# Patient Record
Sex: FEMALE | Race: ASIAN | NOT HISPANIC OR LATINO | URBAN - METROPOLITAN AREA
[De-identification: names, ages, dates, MRNs, and addresses within clinical notes are randomized per-mention and may not be internally consistent; named-entity substitution may affect disease eponyms.]

---

## 2024-09-14 ENCOUNTER — EMERGENCY (EMERGENCY)
Facility: HOSPITAL | Age: 69
LOS: 1 days | Discharge: HOME | End: 2024-09-14
Attending: INTERNAL MEDICINE | Admitting: INTERNAL MEDICINE
Payer: COMMERCIAL

## 2024-09-14 VITALS
HEART RATE: 62 BPM | TEMPERATURE: 98 F | RESPIRATION RATE: 16 BRPM | OXYGEN SATURATION: 97 % | DIASTOLIC BLOOD PRESSURE: 69 MMHG | WEIGHT: 123.02 LBS | SYSTOLIC BLOOD PRESSURE: 150 MMHG | HEIGHT: 63 IN

## 2024-09-14 VITALS
OXYGEN SATURATION: 99 % | SYSTOLIC BLOOD PRESSURE: 144 MMHG | TEMPERATURE: 98 F | HEART RATE: 58 BPM | DIASTOLIC BLOOD PRESSURE: 80 MMHG | RESPIRATION RATE: 17 BRPM

## 2024-09-14 LAB
ALBUMIN SERPL ELPH-MCNC: 3.6 G/DL — SIGNIFICANT CHANGE UP (ref 3.3–5)
ALP SERPL-CCNC: 139 U/L — HIGH (ref 40–120)
ALT FLD-CCNC: 25 U/L — SIGNIFICANT CHANGE UP (ref 12–78)
ANION GAP SERPL CALC-SCNC: 4 MMOL/L — LOW (ref 5–17)
AST SERPL-CCNC: 21 U/L — SIGNIFICANT CHANGE UP (ref 15–37)
BASOPHILS # BLD AUTO: 0.02 K/UL — SIGNIFICANT CHANGE UP (ref 0–0.2)
BASOPHILS NFR BLD AUTO: 0.3 % — SIGNIFICANT CHANGE UP (ref 0–2)
BILIRUB SERPL-MCNC: 0.6 MG/DL — SIGNIFICANT CHANGE UP (ref 0.2–1.2)
BUN SERPL-MCNC: 16 MG/DL — SIGNIFICANT CHANGE UP (ref 7–23)
CALCIUM SERPL-MCNC: 10.2 MG/DL — HIGH (ref 8.5–10.1)
CHLORIDE SERPL-SCNC: 111 MMOL/L — HIGH (ref 96–108)
CO2 SERPL-SCNC: 25 MMOL/L — SIGNIFICANT CHANGE UP (ref 22–31)
CREAT SERPL-MCNC: 1 MG/DL — SIGNIFICANT CHANGE UP (ref 0.5–1.3)
D DIMER BLD IA.RAPID-MCNC: 215 NG/ML DDU — SIGNIFICANT CHANGE UP
EGFR: 61 ML/MIN/1.73M2 — SIGNIFICANT CHANGE UP
EOSINOPHIL # BLD AUTO: 0.51 K/UL — HIGH (ref 0–0.5)
EOSINOPHIL NFR BLD AUTO: 6.4 % — HIGH (ref 0–6)
GLUCOSE SERPL-MCNC: 130 MG/DL — HIGH (ref 70–99)
HCT VFR BLD CALC: 36.7 % — SIGNIFICANT CHANGE UP (ref 34.5–45)
HGB BLD-MCNC: 12.4 G/DL — SIGNIFICANT CHANGE UP (ref 11.5–15.5)
IMM GRANULOCYTES NFR BLD AUTO: 0.3 % — SIGNIFICANT CHANGE UP (ref 0–0.9)
LYMPHOCYTES # BLD AUTO: 1.68 K/UL — SIGNIFICANT CHANGE UP (ref 1–3.3)
LYMPHOCYTES # BLD AUTO: 21.2 % — SIGNIFICANT CHANGE UP (ref 13–44)
MCHC RBC-ENTMCNC: 29.7 PG — SIGNIFICANT CHANGE UP (ref 27–34)
MCHC RBC-ENTMCNC: 33.8 GM/DL — SIGNIFICANT CHANGE UP (ref 32–36)
MCV RBC AUTO: 88 FL — SIGNIFICANT CHANGE UP (ref 80–100)
MONOCYTES # BLD AUTO: 0.89 K/UL — SIGNIFICANT CHANGE UP (ref 0–0.9)
MONOCYTES NFR BLD AUTO: 11.2 % — SIGNIFICANT CHANGE UP (ref 2–14)
NEUTROPHILS # BLD AUTO: 4.8 K/UL — SIGNIFICANT CHANGE UP (ref 1.8–7.4)
NEUTROPHILS NFR BLD AUTO: 60.6 % — SIGNIFICANT CHANGE UP (ref 43–77)
NRBC # BLD: 0 /100 WBCS — SIGNIFICANT CHANGE UP (ref 0–0)
PLATELET # BLD AUTO: 228 K/UL — SIGNIFICANT CHANGE UP (ref 150–400)
POTASSIUM SERPL-MCNC: 4 MMOL/L — SIGNIFICANT CHANGE UP (ref 3.5–5.3)
POTASSIUM SERPL-SCNC: 4 MMOL/L — SIGNIFICANT CHANGE UP (ref 3.5–5.3)
PROT SERPL-MCNC: 8.4 G/DL — HIGH (ref 6–8.3)
RBC # BLD: 4.17 M/UL — SIGNIFICANT CHANGE UP (ref 3.8–5.2)
RBC # FLD: 11.9 % — SIGNIFICANT CHANGE UP (ref 10.3–14.5)
SODIUM SERPL-SCNC: 140 MMOL/L — SIGNIFICANT CHANGE UP (ref 135–145)
TROPONIN I, HIGH SENSITIVITY RESULT: 10.8 NG/L — SIGNIFICANT CHANGE UP
WBC # BLD: 7.92 K/UL — SIGNIFICANT CHANGE UP (ref 3.8–10.5)
WBC # FLD AUTO: 7.92 K/UL — SIGNIFICANT CHANGE UP (ref 3.8–10.5)

## 2024-09-14 PROCEDURE — 99285 EMERGENCY DEPT VISIT HI MDM: CPT | Mod: 25

## 2024-09-14 PROCEDURE — 80053 COMPREHEN METABOLIC PANEL: CPT

## 2024-09-14 PROCEDURE — 93005 ELECTROCARDIOGRAM TRACING: CPT

## 2024-09-14 PROCEDURE — 71275 CT ANGIOGRAPHY CHEST: CPT | Mod: MC

## 2024-09-14 PROCEDURE — 71275 CT ANGIOGRAPHY CHEST: CPT | Mod: 26,MC

## 2024-09-14 PROCEDURE — 84484 ASSAY OF TROPONIN QUANT: CPT

## 2024-09-14 PROCEDURE — 93010 ELECTROCARDIOGRAM REPORT: CPT

## 2024-09-14 PROCEDURE — 96374 THER/PROPH/DIAG INJ IV PUSH: CPT | Mod: XU

## 2024-09-14 PROCEDURE — 85379 FIBRIN DEGRADATION QUANT: CPT

## 2024-09-14 PROCEDURE — 85025 COMPLETE CBC W/AUTO DIFF WBC: CPT

## 2024-09-14 PROCEDURE — 36415 COLL VENOUS BLD VENIPUNCTURE: CPT

## 2024-09-14 RX ORDER — KETOROLAC TROMETHAMINE 30 MG/ML
30 INJECTION, SOLUTION INTRAMUSCULAR ONCE
Refills: 0 | Status: DISCONTINUED | OUTPATIENT
Start: 2024-09-14 | End: 2024-09-14

## 2024-09-14 RX ADMIN — KETOROLAC TROMETHAMINE 30 MILLIGRAM(S): 30 INJECTION, SOLUTION INTRAMUSCULAR at 06:41

## 2024-09-14 NOTE — ED PROVIDER NOTE - CLINICAL SUMMARY MEDICAL DECISION MAKING FREE TEXT BOX
69 y/o female H/O diagnosed having breast CA 2010 C/C left back pain, left infrascapular reproducible point tenderness , pain on inspiration, no headache, no fever, no chills, no toxemia,  no SOB, no radiation, not exertional chest pain, no abdominal pain, no N/V/D, no urinary symptoms. no stroke like symptoms.   seen by her PMD for the same pain, she was prescribed NSAID ointment and Robaxin. VSS Exam stable  labs ekg enzymes and CT scan are non acute, discharged to OP care

## 2024-09-14 NOTE — ED ADULT TRIAGE NOTE - CCCP TRG CHIEF CMPLNT
back pain/injury Cyclophosphamide Counseling:  I discussed with the patient the risks of cyclophosphamide including but not limited to hair loss, hormonal abnormalities, decreased fertility, abdominal pain, diarrhea, nausea and vomiting, bone marrow suppression and infection. The patient understands that monitoring is required while taking this medication.

## 2024-09-14 NOTE — ED ADULT NURSE NOTE - NSFALLUNIVINTERV_ED_ALL_ED
Bed/Stretcher in lowest position, wheels locked, appropriate side rails in place/Call bell, personal items and telephone in reach/Instruct patient to call for assistance before getting out of bed/chair/stretcher/Non-slip footwear applied when patient is off stretcher/[place] to call system/Physically safe environment - no spills, clutter or unnecessary equipment/Purposeful proactive rounding/Room/bathroom lighting operational, light cord in reach

## 2024-09-14 NOTE — ED PROVIDER NOTE - CARE PROVIDER_API CALL
Hedy Sommer  Cardiology  [address] [place] [address]  Phone: [phone]  Fax: [phone]  Follow Up Time: 1-3 Days

## 2024-09-14 NOTE — ED PROVIDER NOTE - NSFOLLOWUPINSTRUCTIONS_ED_ALL_ED_FT
No PE, no blood clot found on the CT Scan  The CT scan incidental finding for thyroid, Left breast area and vertebre recommending further studies non emergent, out patient. These findings were discussed with the patients daughter

## 2024-09-14 NOTE — ED ADULT NURSE REASSESSMENT NOTE - NS ED NURSE REASSESS COMMENT FT1
0725:  received patient.  she is pending discharge.  she was just medicated for pain relief, but is still complaining of pain and does not understand how or wby she can still be in pain if nothing is wrong.  family concerned.  vitals recorded.  family / patient want to speak to md. vidal rn.

## 2024-09-14 NOTE — ED ADULT NURSE REASSESSMENT NOTE - NS ED NURSE REASSESS COMMENT FT1
0805:  patient is d/c.  iv removed.  patient / family requested to speak to MD due to patient's persistent pain x2 months.  MD reviewed all test results.  Due to the patient's history of breast CA, it was recommended that she f/u with oncology.  She is originially from the [place], and her breast cancer was initially treated at [facility].  Since her spouse passing, the family has moved her out here with them, and they need md rec.  Dr. Mina Garland / Sylvie Acosta MD info was provided, and they were recommended to call the office and f/u.  all test results provided and they were encouraged to bring these with them at the time of the appt.  In the interim, it was rec that she take the prescribed muscle relaxants, warm compresses, gel / lido patch, etc.  family verbalized understanding of all instructions.  wheelchair offered, but patient insisted upon walking out.  vitals were previously recorded.  all belongings left with the patient, and she left in stable condition, steady gait.  if any new / worsening symptoms occurs, it was recommended that she return to the er.  lamin drake.

## 2024-09-14 NOTE — ED PROVIDER NOTE - OBJECTIVE STATEMENT
back pain (left scapula) non-traumatic  back pain/injury left back pain, left infrascapular reproducible point tenderness , pain on inspiration, no SOB, no radiation   H/O diagnosed having  breast CA in 2010,  patients daughter notes that she is in remission 67 y/o female H/O diagnosed having breast CA 2010 C/C left back pain, left infrascapular reproducible point tenderness , pain on inspiration, no headache, no fever, no chills, no toxemia,  no SOB, no radiation, not exertional chest pain, no abdominal pain, no N/V/D, no urinary symptoms. no stroke like symptoms.   seen by her PMD for the same pain, she was prescribed NSAID ointment and Robaxin.

## 2024-09-14 NOTE — ED PROVIDER NOTE - SIGNIFICANT NEGATIVE FINDINGS
no headache, no fever, no chills, no toxemia,  no SOB, no radiation, not exertional chest pain, no abdominal pain, no N/V/D, no urinary symptoms. no stroke like symptoms.

## 2024-09-14 NOTE — ED PROVIDER NOTE - PATIENT PORTAL LINK FT
You can access the FollowMyHealth Patient Portal offered by [facility] by registering at the following website: [email or web address]. By joining [facility]’s FollowMyHealth portal, you will also be able to view your health information using other applications (apps) compatible with our system.

## 2024-09-21 ENCOUNTER — INPATIENT (INPATIENT)
Facility: HOSPITAL | Age: 69
LOS: 2 days | Discharge: HOME | DRG: 316 | End: 2024-09-24
Attending: STUDENT IN AN ORGANIZED HEALTH CARE EDUCATION/TRAINING PROGRAM | Admitting: STUDENT IN AN ORGANIZED HEALTH CARE EDUCATION/TRAINING PROGRAM
Payer: COMMERCIAL

## 2024-09-21 VITALS
DIASTOLIC BLOOD PRESSURE: 35 MMHG | OXYGEN SATURATION: 99 % | WEIGHT: 141.98 LBS | HEART RATE: 69 BPM | SYSTOLIC BLOOD PRESSURE: 55 MMHG | HEIGHT: 63 IN | RESPIRATION RATE: 18 BRPM

## 2024-09-21 DIAGNOSIS — Z98.890 OTHER SPECIFIED POSTPROCEDURAL STATES: Chronic | ICD-10-CM

## 2024-09-21 DIAGNOSIS — E78.00 PURE HYPERCHOLESTEROLEMIA, UNSPECIFIED: ICD-10-CM

## 2024-09-21 DIAGNOSIS — I48.91 UNSPECIFIED ATRIAL FIBRILLATION: ICD-10-CM

## 2024-09-21 DIAGNOSIS — Z29.9 ENCOUNTER FOR PROPHYLACTIC MEASURES, UNSPECIFIED: ICD-10-CM

## 2024-09-21 DIAGNOSIS — I95.9 HYPOTENSION, UNSPECIFIED: ICD-10-CM

## 2024-09-21 DIAGNOSIS — Z90.49 ACQUIRED ABSENCE OF OTHER SPECIFIED PARTS OF DIGESTIVE TRACT: Chronic | ICD-10-CM

## 2024-09-21 DIAGNOSIS — E05.90 THYROTOXICOSIS, UNSPECIFIED WITHOUT THYROTOXIC CRISIS OR STORM: ICD-10-CM

## 2024-09-21 DIAGNOSIS — M54.9 DORSALGIA, UNSPECIFIED: ICD-10-CM

## 2024-09-21 DIAGNOSIS — B34.8 OTHER VIRAL INFECTIONS OF UNSPECIFIED SITE: ICD-10-CM

## 2024-09-21 DIAGNOSIS — I10 ESSENTIAL (PRIMARY) HYPERTENSION: ICD-10-CM

## 2024-09-21 LAB
ALBUMIN SERPL ELPH-MCNC: 3.2 G/DL — LOW (ref 3.3–5)
ALP SERPL-CCNC: 132 U/L — HIGH (ref 40–120)
ALT FLD-CCNC: 24 U/L — SIGNIFICANT CHANGE UP (ref 12–78)
ANION GAP SERPL CALC-SCNC: 9 MMOL/L — SIGNIFICANT CHANGE UP (ref 5–17)
APPEARANCE UR: CLEAR — SIGNIFICANT CHANGE UP
APTT BLD: 37.3 SEC — HIGH (ref 24.5–35.6)
AST SERPL-CCNC: 25 U/L — SIGNIFICANT CHANGE UP (ref 15–37)
BASOPHILS # BLD AUTO: 0.03 K/UL — SIGNIFICANT CHANGE UP (ref 0–0.2)
BASOPHILS NFR BLD AUTO: 0.4 % — SIGNIFICANT CHANGE UP (ref 0–2)
BILIRUB SERPL-MCNC: 0.5 MG/DL — SIGNIFICANT CHANGE UP (ref 0.2–1.2)
BILIRUB UR-MCNC: NEGATIVE — SIGNIFICANT CHANGE UP
BUN SERPL-MCNC: 16 MG/DL — SIGNIFICANT CHANGE UP (ref 7–23)
CALCIUM SERPL-MCNC: 9.4 MG/DL — SIGNIFICANT CHANGE UP (ref 8.5–10.1)
CHLORIDE SERPL-SCNC: 108 MMOL/L — SIGNIFICANT CHANGE UP (ref 96–108)
CO2 SERPL-SCNC: 19 MMOL/L — LOW (ref 22–31)
COLOR SPEC: YELLOW — SIGNIFICANT CHANGE UP
CREAT SERPL-MCNC: 1.5 MG/DL — HIGH (ref 0.5–1.3)
DIFF PNL FLD: NEGATIVE — SIGNIFICANT CHANGE UP
EGFR: 38 ML/MIN/1.73M2 — LOW
EOSINOPHIL # BLD AUTO: 0.51 K/UL — HIGH (ref 0–0.5)
EOSINOPHIL NFR BLD AUTO: 6.3 % — HIGH (ref 0–6)
GLUCOSE SERPL-MCNC: 224 MG/DL — HIGH (ref 70–99)
GLUCOSE UR QL: NEGATIVE MG/DL — SIGNIFICANT CHANGE UP
HCT VFR BLD CALC: 38.1 % — SIGNIFICANT CHANGE UP (ref 34.5–45)
HGB BLD-MCNC: 12.5 G/DL — SIGNIFICANT CHANGE UP (ref 11.5–15.5)
IMM GRANULOCYTES NFR BLD AUTO: 0.4 % — SIGNIFICANT CHANGE UP (ref 0–0.9)
INR BLD: 1.02 RATIO — SIGNIFICANT CHANGE UP (ref 0.85–1.18)
KETONES UR-MCNC: NEGATIVE MG/DL — SIGNIFICANT CHANGE UP
LACTATE SERPL-SCNC: 1.6 MMOL/L — SIGNIFICANT CHANGE UP (ref 0.7–2)
LACTATE SERPL-SCNC: 4.2 MMOL/L — CRITICAL HIGH (ref 0.7–2)
LEUKOCYTE ESTERASE UR-ACNC: NEGATIVE — SIGNIFICANT CHANGE UP
LYMPHOCYTES # BLD AUTO: 2.12 K/UL — SIGNIFICANT CHANGE UP (ref 1–3.3)
LYMPHOCYTES # BLD AUTO: 26.3 % — SIGNIFICANT CHANGE UP (ref 13–44)
MCHC RBC-ENTMCNC: 29.2 PG — SIGNIFICANT CHANGE UP (ref 27–34)
MCHC RBC-ENTMCNC: 32.8 GM/DL — SIGNIFICANT CHANGE UP (ref 32–36)
MCV RBC AUTO: 89 FL — SIGNIFICANT CHANGE UP (ref 80–100)
MONOCYTES # BLD AUTO: 0.51 K/UL — SIGNIFICANT CHANGE UP (ref 0–0.9)
MONOCYTES NFR BLD AUTO: 6.3 % — SIGNIFICANT CHANGE UP (ref 2–14)
NEUTROPHILS # BLD AUTO: 4.85 K/UL — SIGNIFICANT CHANGE UP (ref 1.8–7.4)
NEUTROPHILS NFR BLD AUTO: 60.3 % — SIGNIFICANT CHANGE UP (ref 43–77)
NITRITE UR-MCNC: NEGATIVE — SIGNIFICANT CHANGE UP
NRBC # BLD: 0 /100 WBCS — SIGNIFICANT CHANGE UP (ref 0–0)
PH UR: 7 — SIGNIFICANT CHANGE UP (ref 5–8)
PLATELET # BLD AUTO: 293 K/UL — SIGNIFICANT CHANGE UP (ref 150–400)
POTASSIUM SERPL-MCNC: 4.1 MMOL/L — SIGNIFICANT CHANGE UP (ref 3.5–5.3)
POTASSIUM SERPL-SCNC: 4.1 MMOL/L — SIGNIFICANT CHANGE UP (ref 3.5–5.3)
PROT SERPL-MCNC: 7.9 G/DL — SIGNIFICANT CHANGE UP (ref 6–8.3)
PROT UR-MCNC: NEGATIVE MG/DL — SIGNIFICANT CHANGE UP
PROTHROM AB SERPL-ACNC: 11.6 SEC — SIGNIFICANT CHANGE UP (ref 9.5–13)
RAPID RVP RESULT: DETECTED
RBC # BLD: 4.28 M/UL — SIGNIFICANT CHANGE UP (ref 3.8–5.2)
RBC # FLD: 12 % — SIGNIFICANT CHANGE UP (ref 10.3–14.5)
RV+EV RNA SPEC QL NAA+PROBE: DETECTED
SARS-COV-2 RNA SPEC QL NAA+PROBE: SIGNIFICANT CHANGE UP
SODIUM SERPL-SCNC: 136 MMOL/L — SIGNIFICANT CHANGE UP (ref 135–145)
SP GR SPEC: 1 — LOW (ref 1–1.03)
TROPONIN I, HIGH SENSITIVITY RESULT: 11.4 NG/L — SIGNIFICANT CHANGE UP
TSH SERPL-MCNC: 0.18 UIU/ML — LOW (ref 0.36–3.74)
UROBILINOGEN FLD QL: 0.2 MG/DL — SIGNIFICANT CHANGE UP (ref 0.2–1)
WBC # BLD: 8.05 K/UL — SIGNIFICANT CHANGE UP (ref 3.8–10.5)
WBC # FLD AUTO: 8.05 K/UL — SIGNIFICANT CHANGE UP (ref 3.8–10.5)

## 2024-09-21 PROCEDURE — 70450 CT HEAD/BRAIN W/O DYE: CPT | Mod: 26,MC

## 2024-09-21 PROCEDURE — 99223 1ST HOSP IP/OBS HIGH 75: CPT | Mod: GC

## 2024-09-21 PROCEDURE — 99285 EMERGENCY DEPT VISIT HI MDM: CPT

## 2024-09-21 PROCEDURE — 71250 CT THORAX DX C-: CPT | Mod: 26,MC

## 2024-09-21 PROCEDURE — 74176 CT ABD & PELVIS W/O CONTRAST: CPT | Mod: 26,MC

## 2024-09-21 PROCEDURE — 93010 ELECTROCARDIOGRAM REPORT: CPT

## 2024-09-21 PROCEDURE — 71045 X-RAY EXAM CHEST 1 VIEW: CPT | Mod: 26

## 2024-09-21 RX ORDER — SODIUM CHLORIDE 0.9 % (FLUSH) 0.9 %
2000 SYRINGE (ML) INJECTION ONCE
Refills: 0 | Status: COMPLETED | OUTPATIENT
Start: 2024-09-21 | End: 2024-09-21

## 2024-09-21 RX ORDER — SIMVASTATIN 20 MG
1 TABLET ORAL
Refills: 0 | DISCHARGE

## 2024-09-21 RX ORDER — ACETAMINOPHEN 325 MG
650 TABLET ORAL EVERY 6 HOURS
Refills: 0 | Status: DISCONTINUED | OUTPATIENT
Start: 2024-09-21 | End: 2024-09-24

## 2024-09-21 RX ORDER — ACETAMINOPHEN 325 MG
500 TABLET ORAL
Refills: 0 | DISCHARGE

## 2024-09-21 RX ORDER — TRAMADOL HYDROCHLORIDE 50 MG/1
1 TABLET, COATED ORAL
Refills: 0 | DISCHARGE

## 2024-09-21 RX ORDER — DICLOFENAC SODIUM 10 MG/G
2 GEL TOPICAL
Refills: 0 | DISCHARGE

## 2024-09-21 RX ORDER — CRANBERRY FRUIT EXTRACT 650 MG
50 CAPSULE ORAL
Refills: 0 | DISCHARGE

## 2024-09-21 RX ORDER — PIPERACILLIN SODIUM AND TAZOBACTAM SODIUM 12; 1.5 G/60ML; G/60ML
3.38 INJECTION, POWDER, LYOPHILIZED, FOR SOLUTION INTRAVENOUS ONCE
Refills: 0 | Status: COMPLETED | OUTPATIENT
Start: 2024-09-21 | End: 2024-09-21

## 2024-09-21 RX ORDER — CYCLOBENZAPRINE HCL 10 MG
1 TABLET ORAL
Refills: 0 | DISCHARGE

## 2024-09-21 RX ADMIN — PIPERACILLIN SODIUM AND TAZOBACTAM SODIUM 200 GRAM(S): 12; 1.5 INJECTION, POWDER, LYOPHILIZED, FOR SOLUTION INTRAVENOUS at 17:53

## 2024-09-21 RX ADMIN — Medication 2000 MILLILITER(S): at 16:44

## 2024-09-21 NOTE — ED ADULT NURSE NOTE - NSFALLHARMRISKINTERV_ED_ALL_ED

## 2024-09-21 NOTE — H&P ADULT - ASSESSMENT
Pt is a 68-year-old female with a PMHx of hyperthyroidism on methimazole, hypertension, breast CA 2011 s/p lumpectomy, chemo, radiation, and HLD presents to the ED BIBEMS with weakness admitted for hypotension.  Pt is a 68-year-old female with a PMHx of hyperthyroidism on methimazole, hypertension, breast CA 2011 s/p lumpectomy, chemo, radiation, and HLD presents to the ED BIBEMS with weakness admitted for hypotension, new onset afib, and supportive care for Entero/Rhinovirus.

## 2024-09-21 NOTE — H&P ADULT - PROBLEM SELECTOR PLAN 3
-Pt found to have positive RVP on admission for enterovirus  -S/p zosyn in ED  -Pt does not endorse any symptoms currently   -Pt afebrile, no leukocytosis   -Continue supportive measures and NS IV fluids  -Lactate 4.2 initially, now downtrending   -Monitor for fever and trend WBC  -No need to continue Abx

## 2024-09-21 NOTE — H&P ADULT - PROBLEM SELECTOR PLAN 1
Pt BIBEMS to the ED due to sudden weakness, feeling clammy and loss of vision  -Admit to telemetry  -Pt has hx of HTN on multiple BP meds see below   -In ED BP was 55/35 then inc 80/56 and pt given 2L NS IV bolus, Zosyn IVPB 3.375g  -Hold home BP meds  -Continue to monitor hemodynamics and new/worsening symptoms   -Consult cardiology, Dr. Limon, f/u recs Pt BIBEMS to the ED due to sudden weakness, feeling clammy and loss of vision  -Admit to telemetry  -Pt has hx of HTN on multiple BP meds see below   -In ED BP was 55/35, given 2 L bolus. BP fluid responsive--> inc to 80/56-->  137/92  -Hold home BP meds  -Continue to monitor hemodynamics and new/worsening symptoms

## 2024-09-21 NOTE — ED ADULT NURSE NOTE - OBJECTIVE STATEMENT
A/ox4 patient BIBEMS from home for near syncopal episode. Patient found to be hypotensice and bradycardic by EMS. Patient in new onset afib with slow ventricular rate @55bpm. Patient has n complaints of pain, no sob. Afebrile. Pending further labs and radiology reports.

## 2024-09-21 NOTE — H&P ADULT - NSHPSOCIALHISTORY_GEN_ALL_CORE
Tobacco: denies  EtOH: denies  Recreational drug use: denies  Lives with: Daughter, son-in-law and granddaughter   Ambulates: with cane/walker  ADLs: with supervision

## 2024-09-21 NOTE — H&P ADULT - NSHPREVIEWOFSYSTEMS_GEN_ALL_CORE
REVIEW OF SYSTEMS:  CONSTITUTIONAL: no chills, fever  HENMT: No HA, lightheadedness/dizziness  RESPIRATORY: No cough, wheezing, hemoptysis; No shortness of breath.  CARDIOVASCULAR: No chest pain, palpitations.  GASTROINTESTINAL: No abdominal or epigastric pain. No nausea or vomiting; No diarrhea or constipation.  GENITOURINARY: No dysuria, hematuria, or incontinence  NEUROLOGICAL: Sensation intact bilaterally  SKIN: No itching, rashes  MUSCULOSKELETAL: +back pain L>R

## 2024-09-21 NOTE — H&P ADULT - NSHPLABSRESULTS_GEN_ALL_CORE
RNCC Care Coordination Note    Encounter Summary:  RNCC spoke to the patient's wife who stated the patient has now stopped dialysis as of 12/22/2018. Per his wife is doing good and just wants to eat. \" Everything he see's on TV he wants to eat and last night he had a beer and was very happy.\" The patient's wife seem's in good sprits and supports his decision to stop the dialysis. The patient is at [facility] and has now started with palliative care services.     RNCC Assessments    See Care Coordination Smartform for complete baseline and current assessment.       Current Signs and Symptoms Assessed this encounter: Yes   [] No new or worsening symptoms present  Symptoms present:    Cardiac/Pulmonary   [] Chest Pain    [] Cough   [] Increased Sputum   [] Orthopnea   [] Palpitations   [x] Shortness of breath with activity   [] Shortness of breath at rest   [] Swelling    [] Wheezing  Activity   [x] ADL impairment   [] Dizziness   [x] Increased Fatigue   [] Lightheadedness    [] New Fear of Falling or Recent Fall    [x] Weakness  Mood    [] Angry   [] Anxious   [] Crying   [] Depressed   [] Withdrawn  General   [] Acute Weight Concern   [] Appetite Concern   [] Chills    [x] Confusion   [] Constipation   [] Diaphoresis    [] Diarrhea   [] Increased Thirst   [] Inadequate support    [x] Lethargy   [] Nausea   [] Numbness   [] Sleep Concern   []Tingling    [] Urinary Concern   [] Visual Changes   [] Vomiting    [] Wound   [] Other  Acute Pain Assessment         Chronic Pain Assessment         Labs/Diagnostics Reviewed: Not applicable    Symptom Trends:    COPD Symptom Course   No change  CHF Symptom Course   No change  Diabetes Symptom Course   No change  Activity Status  No change  Other known Chronic Condition Symptom Course : Renal failure  Worsening    Utilization Since last  contact:   None     Medication:  New RX since Last Contact: No   New Medication Concerns (See Patient Level Medication section for baseline  adherence assessment): No      Goals Addressed                 This Visit's Progress       Patient Stated    • <enter goal here> (pt-stated)        Patient has requested to stop dialysis.          RNCC Interventions                Interventions Narrative:  1. RNCC contacted the patient's wife and was she did inform RNCC 12/22/2018 was his last day of dialysis.  2. Palliative care services are involved at the group home.  3. Per his wife she has no concerns at this time he is comfortable and eating good.       Acuity Assessment     Acuity Assessment:   Date Acuity Level   12/27/18 3            LABS:  cret                        12.5   8.05  )-----------( 293      ( 21 Sep 2024 16:05 )             38.1     09-21    136  |  108  |  16  ----------------------------<  224[H]  4.1   |  19[L]  |  1.50[H]    Ca    9.4      21 Sep 2024 16:05    TPro  7.9  /  Alb  3.2[L]  /  TBili  0.5  /  DBili  x   /  AST  25  /  ALT  24  /  AlkPhos  132[H]  09-21    PT/INR - ( 21 Sep 2024 16:05 )   PT: 11.6 sec;   INR: 1.02 ratio      PTT - ( 21 Sep 2024 16:05 )  PTT:37.3 sec     < from: X-ray Chest 1 View- PORTABLE-Urgent (09.21.24 @ 16:35) >  PROCEDURE DATE:  09/21/2024    INTERPRETATION:  TECHNIQUE: Single portable view of the chest.  COMPARISON:  None  CLINICAL HISTORY: Sepsis  FINDINGS:  Single frontal view of the chest demonstrates the lungs to be clear. The   cardiomediastinal silhouette is unremarkable. No acute osseous   abnormalities. Overlying EKG leads and wires are noted. Please refer to   the subsequent chest CT for further details.    IMPRESSION: No acute cardiopulmonary disease process.      < from: CT Head No Cont (09.21.24 @ 18:06) >  IMPRESSION:  1. No acute intracranial hemorrhage, mass effect, or midline shift. If   weakness persists, consider further evaluation via MR imaging to include   DWI and ADC mapping techniques, provided there are no contraindications.  2. Bilateral optic globe proptosis. Correlate clinically for presence of   underlying thyroid ophthalmology.    < from: CT Chest No Cont (09.21.24 @ 18:10) >  IMPRESSION:  Enlarged multinodular thyroid gland. Mild mediastinal lymphadenopathy.     < from: CT Abdomen and Pelvis No Cont (09.21.24 @ 18:10) >  IMPRESSION:  Enlarged multinodular thyroid gland. Mild mediastinal lymphadenopathy.     < from: CT Abdomen and Pelvis No Cont (09.21.24 @ 18:10) >  Trace ascites. Possible periportal edema. No other significant acute   abnormality is identified in this limited study without contrast.

## 2024-09-21 NOTE — ED PROVIDER NOTE - OBJECTIVE STATEMENT
daughter translating per pt request  68-year-old female history of hyperthyroidism on methimazole, hypertension, breast CA 2011 s/p lumpectomy, chemo, radiation, presents with weakness.  Daughter states earlier patient was feeling weak.  Family felt that she she would feel better.  However the patient was having weakness and near syncopal episodes.  Was seen in this ED about 1 week ago due to back pain.  Had evaluation including CTA.  About 2 weeks ago patient had abnormal finding on mammogram and is scheduled for upcoming MRI and biopsy.  When CTA was performed 1 week ago patient had a left breast peripheral rim-enhancing fluid attenuation density with overlying skin thickening.  Also with enlarged thyroid levels which patient is aware about and there has been evaluation for possible surgery.  There was also concern for bony metastasis on this scan.  Denies fever, chills, chest pain, trouble breathing, vomiting patient was started on cyclobenzaprine and tramadol earlier this week when she followed up with her PMD.  Daughter states that the medications are given to her at night    pmd joaquín ho

## 2024-09-21 NOTE — H&P ADULT - NSHPPHYSICALEXAM_GEN_ALL_CORE
T(C): 36.6 (09-21-24 @ 21:01), Max: 36.6 (09-21-24 @ 21:01)  HR: 78 (09-21-24 @ 21:01) (61 - 79)  BP: 137/70 (09-21-24 @ 21:01) (55/35 - 137/70)  RR: 18 (09-21-24 @ 21:01) (18 - 18)  SpO2: 96% (09-21-24 @ 21:01) (94% - 99%)    GENERAL: patient appears stated age, appropriately interactive, NAD  EYES: +proptosis  NECK: supple, soft  LUNGS: CTAB, symmetric breath sounds, no wheezing or rhonchi appreciated  HEART: +irregularly irregular rhythm, no lower extremity edema  GASTROINTESTINAL: abdomen is soft, nontender, nondistended, normoactive bowel sounds  INTEGUMENT: good skin turgor, warm skin, appears well perfused  MUSCULOSKELETAL: +TTP left back  NEUROLOGIC: follows commands, answers questions appropriately   HEME/LYMPH: no obvious ecchymosis or petechiae

## 2024-09-21 NOTE — H&P ADULT - PROBLEM SELECTOR PLAN 4
Pt came to ED 1 week ago for severe back pain and was sent home. Back pain continued so went to PCP  -Pt prescribed tramadol, Flexeril, Tylenol and Naproxen  -Pt has hx of breast cancer and previous CTA from 1 week ago is concerning for possible bony metastasis   -Back pain may be 2/2 mets  -Pt was scheduled for outpt MRI and breast biopsy  -C/w tylenol, tramadol and lidocaine patch for pain  -Heme/onc consulted Dr. Garland, f/u recs

## 2024-09-21 NOTE — H&P ADULT - PROBLEM SELECTOR PLAN 5
-Pt has hx of HTN  -On atenolol, losartan and nifedipine at home  -Hold all home BP meds  -Monitor hemodynamics

## 2024-09-21 NOTE — ED PROVIDER NOTE - CLINICAL SUMMARY MEDICAL DECISION MAKING FREE TEXT BOX
Patient is a 68y old  Female who presents with a chief complaint of hypotension. patient on arrival with triage BP of 50's SBP. this improved with IVF into 80's and patient was mentating at her baseline. family reports patient has been fatigued with lightheadedness and recently only complaint was mid back pain paraspinal. she was seen here and given muscle relaxers which was helping. she has a h/o breast CA    PE: Patient is well appearing, no acute distress, no signs of head trauma, lungs clear bilaterally, abdomen is soft and nontender to palpation without guarding or rebound, normal pulses in all extremities, moving all extremities    plan for labs, imaging, medications, urine, will assess for possible infection, thyroid abnormality, possible patient suffering from poly pharmacy or medication overdose    patient found to have elevated lactate without any overt signs of infection. likely elevated due to hypoperfusion issues with low blood pressure, will require admission for further monitoring and assessment of her hypotension and hypoperfusion. given antibiotics prophylactically

## 2024-09-21 NOTE — H&P ADULT - ATTENDING COMMENTS
Pt is a 68-year-old female with a PMHx of hyperthyroidism on methimazole, hypertension, breast CA 2011 s/p lumpectomy, chemo, radiation, and HLD presents to the ED BIBEMS with weakness admitted for hypotension, new onset afib, and supportive care for Entero/Rhinovirus.     #Hypotension  Pt BIBEMS to the ED due to sudden weakness, feeling clammy and loss of vision  Admit to telemetry  Pt has hx of HTN on multiple BP meds see below   In ED BP was 55/35, given 2 L bolus. BP fluid responsive--> inc to 80/56-->  137/92  Hold home BP meds  Continue to monitor hemodynamics and new/worsening symptoms    #New Onset Afib  Pt found to have new onset A-fib per ED  No AC for now despite CHADVASC score 3--> can be in setting of critical illness (low BP, viral infection)  EKG: Ventricular rate 52. AFIB  Continue to monitor symptoms  Cardiology consulted, f/u recs    #Rhinovirus/ Enterovirus  Pt found to have positive RVP on admission for enterovirus  S/p zosyn in ED  Pt does not endorse any symptoms currently   Pt afebrile, no leukocytosis   Continue supportive measures and NS IV fluids  Lactate 4.2 initially, now downtrending   Monitor for fever and trend WBC  No indication to continue Abx    #Back Pain  Pt came to ED 1 week ago for severe back pain and was sent home. Back pain continued so went to PCP  Pt prescribed tramadol, Flexeril, Tylenol and Naproxen  Pt has hx of breast cancer and previous CTA from 1 week ago is concerning for possible bony metastasis   Back pain may be 2/2 mets  Pt was scheduled for outpt MRI and breast biopsy  C/w tylenol, tramadol and lidocaine patch for pain  Patient's family is requesting to see Heme/onc while inpatient;  consulted Dr. Garland, f/u recs    #Hyperthyroidism  Pt has hx of hyperthyroidism on methimazole. Daughter notes that pt is also sometimes hypothyroid  Pt seen by endocrinologist (does not recall name of ) and is being evaluated for possible surgery   CT chest: Enlarged multinodular thyroid gland  TSH 0.18  F/u AM labs-> free T4, total T3  C/w home methimazole  Endocrine consulted Dr, Perlman, f/u recs    Heparin Subq 5000 q8  DASH diet  PT evaluation

## 2024-09-21 NOTE — H&P ADULT - PROBLEM SELECTOR PLAN 6
-Pt has hx of hyperthyroidism on methimazole. Daughter notes that pt is also sometimes hypothyroid  -Pt seen by endocrinologist (does not recall name of ) and is being evaluated for possible surgery   -CT chest: Enlarged multinodular thyroid gland  -TSH 0.18  -F/u AM labs  -C/w home methimazole  -Endocrine consulted Dr, Perlman, f/u recs

## 2024-09-21 NOTE — ED PROVIDER NOTE - CARE PLAN
Principal Discharge DX:	Hypotension  Secondary Diagnosis:	Atrial fibrillation  Secondary Diagnosis:	Rhinovirus   1

## 2024-09-21 NOTE — PATIENT PROFILE ADULT - FALL HARM RISK - HARM RISK INTERVENTIONS

## 2024-09-21 NOTE — ED ADULT TRIAGE NOTE - HEIGHT IN CM
Called patient and left detailed message (Perm on file) informing of  lab order for post vasectomy semen analysis. Informed patient that order would be extended x30 days and if order expires again and we do not hear from patient, we will go ahead and discontinue the order. Instructed patient to call office with any questions or concerns.  
160.02

## 2024-09-21 NOTE — H&P ADULT - HISTORY OF PRESENT ILLNESS
Pt is a 68-year-old female with a PMHx of hyperthyroidism on methimazole, hypertension, breast CA 2011 s/p lumpectomy, chemo, radiation, and HLD presents to the ED BIBEMS with weakness. Daughter at bedside states that the pt came to the ED last Friday for severe back pain and was discharged home the same day. A CTA was performed which showed left breast peripheral rim-enhancing fluid attenuation density with overlying skin thickening. Also with enlarged thyroid levels which patient is aware about and there has been evaluation for possible surgery.There was also concern for bony metastasis on this scan. Then on Sunday patient continued to have intense back pain so daughter called her PCP and they went to her office. Pt was given multiple pain medications with relief of symptoms. Pt daughter states that today in the afternoon the pt said she felt unwell and was shaky, clammy and said she could not see well so they called EMS. Pt currently has no trouble seeing she just notes that she feels, "cold." and still has back pain. Daughter also adds that 2 weeks ago the patient was found to have an abnormal finding on mammogram so an MRI and breast biopsy were ordered.      Denies fever, chest pain, trouble breathing, vomiting, SOB, cough, abdominal pain, nausea, vomiting, diarrhea, constipation, urgency, or dysuria, headaches, changes in vision, dizziness, numbness, tingling.    Denies recent travel, recent antibiotic use, or sick contacts.    ED Course:   Vitals: BP: 55/35 --> 80/56 , HR: 69 RR:18 , Temp: 97.8F SpO2: 99% on RA  Labs:  Cr 1.5, Glucose 224, Lactate 4.2, TSH 0.18, +RVP (rhinovirus), Alk Phos 132, eGFR 38  UA: neg  CXR: IMPRESSION: No acute cardiopulmonary disease process.  CT Head: 1. No acute intracranial hemorrhage, mass effect, or midline shift. Bilateral optic globe proptosis  CT Chest/AP:: Enlarged multinodular thyroid gland. Mild mediastinal lymphadenopathy.   Trace ascites. Possible periportal edema  EKG: Rate 52, interpretation slow A-fib    Received in the ED:  2L NS IV bolus, Zosyn IVPB 3.375g   Pt is a 68-year-old female with a PMHx of hyperthyroidism on methimazole, hypertension, breast CA 2011 s/p lumpectomy, chemo, radiation, and HLD presents to the ED BIBEMS with weakness. Daughter at bedside states that the pt came to the ED last Friday for severe back pain and was discharged home the same day. A CTA was performed which showed left breast peripheral rim-enhancing fluid attenuation density with overlying skin thickening. Also with enlarged thyroid levels which patient is aware about and there has been evaluation for possible surgery.There was also concern for bony metastasis on this scan. Then on Sunday patient continued to have intense back pain so daughter called her PCP and they went to her office. Pt was given multiple pain medications with relief of symptoms. Pt daughter states that today in the afternoon the pt said she felt unwell and was shaky, clammy and said she could not see well so they called EMS. Pt currently has no trouble seeing she just notes that she feels, "cold" and still has back pain. Daughter also adds that 2 weeks ago the patient was found to have an abnormal finding on mammogram so an MRI and breast biopsy were ordered.      Denies fever, chest pain, trouble breathing, vomiting, SOB, cough, abdominal pain, nausea, vomiting, diarrhea, constipation, urgency, or dysuria, headaches, changes in vision, dizziness, numbness, tingling.    Denies recent travel, recent antibiotic use, or sick contacts.    ED Course:   Vitals: BP: 55/35 --> 80/56 , HR: 69 RR:18 , Temp: 97.8F SpO2: 99% on RA  Labs:  Cr 1.5, Glucose 224, Lactate 4.2, TSH 0.18, +RVP (rhinovirus), Alk Phos 132, eGFR 38  UA: neg  CXR: IMPRESSION: No acute cardiopulmonary disease process.  CT Head: 1. No acute intracranial hemorrhage, mass effect, or midline shift. Bilateral optic globe proptosis  CT Chest/AP: Enlarged multinodular thyroid gland. Mild mediastinal lymphadenopathy.   Trace ascites. Possible periportal edema  EKG: Rate 52, interpretation slow A-fib    Received in the ED:  2L NS IV bolus, Zosyn IVPB 3.375g

## 2024-09-21 NOTE — ED ADULT TRIAGE NOTE - CHIEF COMPLAINT QUOTE
Pt BIBA form home for blurry vision and weakness. Upon EMS arrival they noted she was hypotensive and in new onset afib. Pt A&Ox3. HR jumping during triage between 60's- 140's

## 2024-09-21 NOTE — H&P ADULT - PROBLEM SELECTOR PLAN 2
-Pt found to have new onset A-fib per nurse   -No AC for now, f/u cardio recs  -EKG ordered stat, f/u results   -Continue to monitor symptoms  -Cardiology consulted, f/u recs -Pt found to have new onset A-fib per ED  -No AC for now despite CHADVASC score 3--> can be in setting of critical illness (low BP, viral infection)  -EKG: Ventricular rate 52. Afib  -Continue to monitor symptoms  -Cardiology consulted, f/u recs

## 2024-09-22 DIAGNOSIS — R41.0 DISORIENTATION, UNSPECIFIED: ICD-10-CM

## 2024-09-22 LAB
ALBUMIN SERPL ELPH-MCNC: 3.5 G/DL — SIGNIFICANT CHANGE UP (ref 3.3–5)
ALP SERPL-CCNC: 147 U/L — HIGH (ref 40–120)
ALT FLD-CCNC: 48 U/L — SIGNIFICANT CHANGE UP (ref 12–78)
ANION GAP SERPL CALC-SCNC: 7 MMOL/L — SIGNIFICANT CHANGE UP (ref 5–17)
AST SERPL-CCNC: 38 U/L — HIGH (ref 15–37)
BASOPHILS # BLD AUTO: 0.02 K/UL — SIGNIFICANT CHANGE UP (ref 0–0.2)
BASOPHILS NFR BLD AUTO: 0.2 % — SIGNIFICANT CHANGE UP (ref 0–2)
BILIRUB SERPL-MCNC: 0.8 MG/DL — SIGNIFICANT CHANGE UP (ref 0.2–1.2)
BUN SERPL-MCNC: 11 MG/DL — SIGNIFICANT CHANGE UP (ref 7–23)
CALCIUM SERPL-MCNC: 9.5 MG/DL — SIGNIFICANT CHANGE UP (ref 8.5–10.1)
CHLORIDE SERPL-SCNC: 113 MMOL/L — HIGH (ref 96–108)
CO2 SERPL-SCNC: 24 MMOL/L — SIGNIFICANT CHANGE UP (ref 22–31)
CREAT SERPL-MCNC: 1.1 MG/DL — SIGNIFICANT CHANGE UP (ref 0.5–1.3)
EGFR: 55 ML/MIN/1.73M2 — LOW
EOSINOPHIL # BLD AUTO: 0.22 K/UL — SIGNIFICANT CHANGE UP (ref 0–0.5)
EOSINOPHIL NFR BLD AUTO: 2.6 % — SIGNIFICANT CHANGE UP (ref 0–6)
GLUCOSE SERPL-MCNC: 93 MG/DL — SIGNIFICANT CHANGE UP (ref 70–99)
HCT VFR BLD CALC: 39.6 % — SIGNIFICANT CHANGE UP (ref 34.5–45)
HGB BLD-MCNC: 13.6 G/DL — SIGNIFICANT CHANGE UP (ref 11.5–15.5)
IMM GRANULOCYTES NFR BLD AUTO: 0.2 % — SIGNIFICANT CHANGE UP (ref 0–0.9)
LYMPHOCYTES # BLD AUTO: 1.69 K/UL — SIGNIFICANT CHANGE UP (ref 1–3.3)
LYMPHOCYTES # BLD AUTO: 19.7 % — SIGNIFICANT CHANGE UP (ref 13–44)
MCHC RBC-ENTMCNC: 30.2 PG — SIGNIFICANT CHANGE UP (ref 27–34)
MCHC RBC-ENTMCNC: 34.3 GM/DL — SIGNIFICANT CHANGE UP (ref 32–36)
MCV RBC AUTO: 88 FL — SIGNIFICANT CHANGE UP (ref 80–100)
MONOCYTES # BLD AUTO: 0.62 K/UL — SIGNIFICANT CHANGE UP (ref 0–0.9)
MONOCYTES NFR BLD AUTO: 7.2 % — SIGNIFICANT CHANGE UP (ref 2–14)
NEUTROPHILS # BLD AUTO: 6 K/UL — SIGNIFICANT CHANGE UP (ref 1.8–7.4)
NEUTROPHILS NFR BLD AUTO: 70.1 % — SIGNIFICANT CHANGE UP (ref 43–77)
NRBC # BLD: 0 /100 WBCS — SIGNIFICANT CHANGE UP (ref 0–0)
PLATELET # BLD AUTO: 263 K/UL — SIGNIFICANT CHANGE UP (ref 150–400)
POTASSIUM SERPL-MCNC: 3.5 MMOL/L — SIGNIFICANT CHANGE UP (ref 3.5–5.3)
POTASSIUM SERPL-SCNC: 3.5 MMOL/L — SIGNIFICANT CHANGE UP (ref 3.5–5.3)
PROT SERPL-MCNC: 8.4 G/DL — HIGH (ref 6–8.3)
RBC # BLD: 4.5 M/UL — SIGNIFICANT CHANGE UP (ref 3.8–5.2)
RBC # FLD: 11.9 % — SIGNIFICANT CHANGE UP (ref 10.3–14.5)
SODIUM SERPL-SCNC: 144 MMOL/L — SIGNIFICANT CHANGE UP (ref 135–145)
T3 SERPL-MCNC: 172 NG/DL — SIGNIFICANT CHANGE UP (ref 80–200)
T4 AB SER-ACNC: 2.4 UG/DL — LOW (ref 4.6–12)
T4 FREE SERPL-MCNC: 0.3 NG/DL — LOW (ref 0.9–1.8)
WBC # BLD: 8.57 K/UL — SIGNIFICANT CHANGE UP (ref 3.8–10.5)
WBC # FLD AUTO: 8.57 K/UL — SIGNIFICANT CHANGE UP (ref 3.8–10.5)

## 2024-09-22 PROCEDURE — 93010 ELECTROCARDIOGRAM REPORT: CPT

## 2024-09-22 PROCEDURE — 99223 1ST HOSP IP/OBS HIGH 75: CPT

## 2024-09-22 PROCEDURE — 99233 SBSQ HOSP IP/OBS HIGH 50: CPT | Mod: GC

## 2024-09-22 RX ORDER — TRAMADOL HYDROCHLORIDE 50 MG/1
50 TABLET, COATED ORAL EVERY 6 HOURS
Refills: 0 | Status: DISCONTINUED | OUTPATIENT
Start: 2024-09-22 | End: 2024-09-24

## 2024-09-22 RX ORDER — MULTI VITAMIN/MINERAL SUPPLEMENT WITH ASCORBIC ACID, NIACIN, PYRIDOXINE, PANTOTHENIC ACID, FOLIC ACID, RIBOFLAVIN, THIAMIN, BIOTIN, COBALAMIN AND ZINC. 60; 20; 12.5; 10; 10; 1.7; 1.5; 1; .3; .006 MG/1; MG/1; MG/1; MG/1; MG/1; MG/1; MG/1; MG/1; MG/1; MG/1
1 TABLET, COATED ORAL DAILY
Refills: 0 | Status: DISCONTINUED | OUTPATIENT
Start: 2024-09-21 | End: 2024-09-24

## 2024-09-22 RX ORDER — SODIUM CHLORIDE 0.9 % (FLUSH) 0.9 %
1000 SYRINGE (ML) INJECTION
Refills: 0 | Status: DISCONTINUED | OUTPATIENT
Start: 2024-09-22 | End: 2024-09-24

## 2024-09-22 RX ORDER — LIDOCAINE 50 MG/G
1 CREAM TOPICAL DAILY
Refills: 0 | Status: DISCONTINUED | OUTPATIENT
Start: 2024-09-22 | End: 2024-09-24

## 2024-09-22 RX ORDER — PANTOPRAZOLE SODIUM 40 MG/1
40 TABLET, DELAYED RELEASE ORAL DAILY
Refills: 0 | Status: DISCONTINUED | OUTPATIENT
Start: 2024-09-21 | End: 2024-09-24

## 2024-09-22 RX ORDER — 5-HYDROXYTRYPTOPHAN (5-HTP) 100 MG
5 TABLET,DISINTEGRATING ORAL AT BEDTIME
Refills: 0 | Status: DISCONTINUED | OUTPATIENT
Start: 2024-09-22 | End: 2024-09-24

## 2024-09-22 RX ORDER — ATORVASTATIN CALCIUM 10 MG/1
10 TABLET, FILM COATED ORAL AT BEDTIME
Refills: 0 | Status: DISCONTINUED | OUTPATIENT
Start: 2024-09-21 | End: 2024-09-24

## 2024-09-22 RX ADMIN — LIDOCAINE 1 APPLICATION(S): 50 CREAM TOPICAL at 11:25

## 2024-09-22 RX ADMIN — Medication 5000 UNIT(S): at 17:13

## 2024-09-22 RX ADMIN — Medication 70 MILLILITER(S): at 01:22

## 2024-09-22 RX ADMIN — MULTI VITAMIN/MINERAL SUPPLEMENT WITH ASCORBIC ACID, NIACIN, PYRIDOXINE, PANTOTHENIC ACID, FOLIC ACID, RIBOFLAVIN, THIAMIN, BIOTIN, COBALAMIN AND ZINC. 1 TABLET(S): 60; 20; 12.5; 10; 10; 1.7; 1.5; 1; .3; .006 TABLET, COATED ORAL at 11:22

## 2024-09-22 RX ADMIN — Medication 5000 UNIT(S): at 06:05

## 2024-09-22 RX ADMIN — PANTOPRAZOLE SODIUM 40 MILLIGRAM(S): 40 TABLET, DELAYED RELEASE ORAL at 11:22

## 2024-09-22 RX ADMIN — ATORVASTATIN CALCIUM 10 MILLIGRAM(S): 10 TABLET, FILM COATED ORAL at 22:00

## 2024-09-22 RX ADMIN — Medication 5 MILLIGRAM(S): at 22:00

## 2024-09-22 NOTE — CONSULT NOTE ADULT - NS ATTEND AMEND GEN_ALL_CORE FT
68-year-old female with a PMHx of hyperthyroidism on methimazole, hypertension, breast CA 2011 s/p lumpectomy, chemo, radiation, and HLD presents to the ED BIBEMS with weakness admitted for hypotension, new onset afib, and supportive care for Entero/Rhinovirus.     hyperthyroid on tapazole  breast ca with what seems to be a ?recurrence   presents with weakness, hypotension, viral infection and seemingly asymptomatic af  converted spontaneously to sr  note that she is likely to have recurrences given her hyperthyroid state, and that based on age, htn and malignancy her risk of cardioembolic stroke seems elevated  no recurrences noted so far, but recurrences seem likely  would favor starting ac, and would need to prove that this is a one time issue before considering her to be sufficiently low risk to stop ac   bp marginal s/p ivf, bp meds remain on hold  echo, tele  no acute ischemia

## 2024-09-22 NOTE — PROGRESS NOTE ADULT - PROBLEM SELECTOR PLAN 3
Pt came to ED 1 week ago for severe back pain and was sent home. Back pain continued so went to PCP  -Pt prescribed tramadol, Flexeril, Tylenol and Naproxen  -Pt has hx of breast cancer and previous CTA from 1 week ago is concerning for possible bony metastasis   -Back pain may be 2/2 mets vs MSK related  -Pt was scheduled for outpt MRI and breast biopsy  -C/w tylenol, tramadol and lidocaine patch for pain  - f/u bone scan  - f/u MR thoracic  - f/u CA 27.29  - Heme/onc consulted Dr. Garland, f/u recs Pt came to ED 1 week ago for severe back pain and was sent home. Back pain continued so went to PCP  -Pt prescribed tramadol, Flexeril, Tylenol and Naproxen  -Pt has hx of breast cancer and previous CTA from 1 week ago is concerning for possible bony metastasis   -Back pain may be 2/2 mets vs MSK related  -Pt was scheduled for outpt MRI and breast biopsy  -C/w tylenol, tramadol and lidocaine patch for pain  - f/u bone scan  - f/u MR thoracic  - f/u CA 27.29  - Heme/onc consulted Dr. Acosta, f/u recs

## 2024-09-22 NOTE — PROGRESS NOTE ADULT - ASSESSMENT
Pt is a 68-year-old female with a PMHx of hyperthyroidism on methimazole, hypertension, breast CA 2011 s/p lumpectomy, chemo, radiation, and HLD presents to the ED BIBEMS with weakness admitted for hypotension, new onset afib, and supportive care for Entero/Rhinovirus.

## 2024-09-22 NOTE — CONSULT NOTE ADULT - ASSESSMENT
69 yo woman w hyper/hypothyroid on methimazole known of enlarged thyroid for years and has delayed surgery(now planned and has surgery followup), Stage IA [id number]  left breast ca 2017 post lumpectomy [facility] (6mm w additional DCIS, ERPR positive, Qqo2Rrw negative) post adj RT and 5yrs of hormne prophylaxis(could not recall name)  Pt has been taking care of ill adriánad who recently  from cancer and neglicting own care  Recently had mammo as outpatient(new Radiology facility, has not gone for mammo since COVID pandemic), and found left breast abnormality for MRI and bx next Tuesday.  Incr mid back pain x 2 wks, attributed to lifting and moving ill . seen last  in ER for sever back pain(), then PCP on , given 2 new meds, pain subsided on Monday but w the medicaton incr weakness, unsteadiness, and now adm to [facility]  w/u-  24 CTQ chest- no PE, marked enlarged thyroid w low attenuation and calcif. Left breast 5x4.2cm carter rim-enhancing fluid attenuation density, left breast skini thickening. Heterogenous vertebra recommend Bone Scan.  24 CT head no acute findings  24 CT c/a/p again seen the marked enlarged mutinodular thyroid, left breast 4.8cm fluid atten lesion felt could be post op seroma. Bone w scoliosis/degen changes. Liver periportal edema, Pancreas sm calcif  Labs mild incr alk phos and second lab min incr SGOT  Positive for enterovirus/rhinovirus    -left breast finding may be due to post surgical and RT changes of skin and seroma  -the 2013 left breast ca was very early stage, unlikely to be met. Although pt has not had mammo since COVID  until recently so new breast malignancy not related to CT findings still possible(pt has next Tues outpatinet MRi and bx planned)  -back pain may be due to muscle strain due to lifting , but agree should r/o mets clemencia w sl incr of alk phos and heterogen vertebra finding on 24 CTA chest  -also possible primary thyroid ca (+/- bone mets) in view of thyroid status  -will order Bone Scan, CA 27.29  -consider Ortho eval and MRI back    discussed w pt and daughter  thank you, will follow w you

## 2024-09-22 NOTE — CONSULT NOTE ADULT - SUBJECTIVE AND OBJECTIVE BOX
[facility] - Braxton Devries, Delma, Kemal, Finn, Kaye, Glen; Office Number: [phone]    Initial Consult Note  CHIEF COMPLAINT: Patient is a 68y old  Female who presents with a chief complaint of hypotension (21 Sep 2024 21:49)    HPI: 68-year-old female with a PMH of hyperthyroidism on methimazole, hypertension, breast CA 2011 s/p lumpectomy, chemo, radiation, and HLD presents to the ED BIBEMS with weakness. Daughter at bedside states that the pt came to the ED last Friday for severe back pain and was discharged home the same day. A CTA was performed which showed left breast peripheral rim-enhancing fluid attenuation density with overlying skin thickening. Also with enlarged thyroid levels which patient is aware about and there has been evaluation for possible surgery. There was also concern for bony metastasis on this scan. Then on Sunday patient continued to have intense back pain so daughter called her PCP and they went to her office. Pt was given multiple pain medications with relief of symptoms. Pt daughter states that in the afternoon of 9/21 the pt said she felt unwell and was shaky, clammy and said she could not see well so they called EMS. Daughter also adds that 2 weeks ago the patient was found to have an abnormal finding on mammogram so an MRI and breast biopsy were ordered.     Denies fever, chest pain, trouble breathing, vomiting, SOB, cough, abdominal pain, nausea, vomiting, diarrhea, constipation, urgency, or dysuria, headaches, changes in vision, dizziness, numbness, tingling. Denies recent travel, recent antibiotic use, or sick contacts.    Pt was initially hypotensive in ER BP: 55/35 --> 80/56 with Lactate 4.2, TSH 0.18, +RVP (rhinovirus). CT Head: No acute intracranial hemorrhage, mass effect, or midline shift. Bilateral optic globe proptosis. CT Chest/AP: Enlarged multinodular thyroid gland. Mild mediastinal lymphadenopathy. Trace ascites. Possible periportal edema. EKG w/ A fib. Pt admitted for hypotension, new onset afib, and supportive care for Entero/Rhinovirus.    Allergies    No Known Allergies    Intolerances      PAST MEDICAL & SURGICAL HISTORY:  Hyperthyroidism      High cholesterol      Hypertension      Hypertension      History of cholecystectomy      H/O lumpectomy        MEDICATIONS  (STANDING):  atorvastatin 10 milliGRAM(s) Oral at bedtime  heparin   Injectable 5000 Unit(s) SubCutaneous every 12 hours  lidocaine 2% Gel 1 Application(s) Topical daily  methimazole 10 milliGRAM(s) Oral <User Schedule>  multivitamin 1 Tablet(s) Oral daily  pantoprazole   Suspension 40 milliGRAM(s) Oral daily  sodium chloride 0.9%. 1000 milliLiter(s) (70 mL/Hr) IV Continuous <Continuous>    MEDICATIONS  (PRN):  acetaminophen     Tablet .. 650 milliGRAM(s) Oral every 6 hours PRN Temp greater or equal to 38C (100.4F), Mild Pain (1 - 3)  traMADol 50 milliGRAM(s) Oral every 6 hours PRN Severe Pain (7 - 10)    FAMILY HISTORY:  No pertinent family history in first degree relatives       No family history of acute MI or sudden cardiac death.    SOCIAL HISTORY: No active tobacco, ethanol, or drug abuse.    REVIEW OF SYSTEMS   All other review of systems is negative unless indicated above.    VITAL SIGNS:   Vital Signs Last 24 Hrs  T(C): 36.5 (22 Sep 2024 05:22), Max: 36.6 (21 Sep 2024 21:01)  T(F): 97.7 (22 Sep 2024 05:22), Max: 97.9 (21 Sep 2024 21:01)  HR: 72 (22 Sep 2024 05:22) (61 - 88)  BP: 134/73 (22 Sep 2024 05:22) (55/35 - 137/92)  BP(mean): --  RR: 18 (22 Sep 2024 05:22) (18 - 18)  SpO2: 94% (22 Sep 2024 05:22) (94% - 99%)    Parameters below as of 22 Sep 2024 05:22  Patient On (Oxygen Delivery Method): room air        Physical Exam:  Constitutional: NAD, awake and alert  HEENT: Moist Mucous Membranes, Anicteric  Pulmonary: Non-labored, breath sounds are clear bilaterally, No wheezing, rales or rhonchi  Cardiovascular: Regular, S1 and S2, No murmurs, No rubs, gallops or clicks  Gastrointestinal: Bowel Sounds present, soft, nontender.   Lymph: No peripheral edema. No lymphadenopathy.  Skin: No visible rashes or ulcers.  Psych:  Mood & affect appropriate    I&O's Summary      LABS: All Labs Reviewed:                        12.5   8.05  )-----------( 293      ( 21 Sep 2024 16:05 )             38.1     21 Sep 2024 16:05    136    |  108    |  16     ----------------------------<  224    4.1     |  19     |  1.50     Ca    9.4        21 Sep 2024 16:05    TPro  7.9    /  Alb  3.2    /  TBili  0.5    /  DBili  x      /  AST  25     /  ALT  24     /  AlkPhos  132    21 Sep 2024 16:05    PT/INR - ( 21 Sep 2024 16:05 )   PT: 11.6 sec;   INR: 1.02 ratio         PTT - ( 21 Sep 2024 16:05 )  PTT:37.3 secTroponin I, High Sensitivity Result: 11.4 ng/L (09-21-24 @ 16:05)  Troponin I, High Sensitivity Result: 10.8 ng/L (09-14-24 @ 01:40)  Thyroid Stimulating Hormone, Serum: 0.18 uIU/mL (09-21-24 @ 16:05)      12 Lead ECG:   Ventricular Rate 62 BPM    Atrial Rate 62 BPM    P-R Interval 142 ms    QRS Duration 86 ms    Q-T Interval 380 ms    QTC Calculation(Bazett) 385 ms    P Axis 55 degrees    R Axis 35 degrees    T Axis 28 degrees    Diagnosis Line Normal sinus rhythm  Normal ECG  No previous ECGs available  Confirmed by FABIANO DAVID ([id number]) on 9/14/2024 8:48:41 PM (09-14-24 @ 01:25)     [facility] - Braxton Devries, Delma, Kemal, Finn, Kaye, Glen; Office Number: [phone]    Initial Consult Note  CHIEF COMPLAINT: Patient is a 68y old  Female who presents with a chief complaint of hypotension (21 Sep 2024 21:49)    HPI: 68-year-old female with a PMH of hyperthyroidism on methimazole, hypertension, breast CA 2011 s/p lumpectomy, chemo, radiation, and HLD presents to the ED BIB EMS with weakness. Daughter at bedside states that the pt came to the ED last Friday for severe back pain and was discharged home the same day. A CTA was performed which showed left breast peripheral rim-enhancing fluid attenuation density with overlying skin thickening.  Daughter also adds that 2 weeks ago the patient was found to have an abnormal finding on mammogram so an MRI and breast biopsy were ordered. Also with enlarged thyroid levels which patient is aware about and there has been evaluation for possible surgery. There was also concern for bony metastasis on this scan. Then on Sunday patient continued to have intense back pain so daughter called her PCP and they went to her office. Pt was given multiple pain medications with relief of symptoms. Pt daughter states that in the afternoon of 9/21 the pt said she felt unwell and was shaky, clammy and said she could not see well so they called EMS. Denies fever, chest pain, trouble breathing, vomiting, SOB, cough, abdominal pain, nausea, vomiting, diarrhea, constipation, urgency, or dysuria, headaches, changes in vision, dizziness, numbness, tingling. Denies recent travel, recent antibiotic use, or sick contacts.    Pt was initially hypotensive in ER BP: 55/35 --> 80/56 with Lactate 4.2-> normalized, TSH 0.18, +RVP (rhinovirus). CT Head: No acute intracranial hemorrhage, mass effect, or midline shift. Bilateral optic globe proptosis. CT Chest/AP: Enlarged multinodular thyroid gland. Mild mediastinal lymphadenopathy. Trace ascites. Possible periportal edema. EKG w/ A fib. Pt admitted for hypotension, new onset Afib and supportive care for Entero/Rhinovirus. Patient seen and examined. Patient awake, alert, resting in bed. No complaints of chest pain, dyspnea, palpitations or dizziness. No signs of orthopnea or PND. Reports feelign weak and had poor sleep overnight. Daughter at bedside. Pt was in A fib in ER, but converted to SR 9/21 by the time she came to tele.     Allergies    No Known Allergies    Intolerances      PAST MEDICAL & SURGICAL HISTORY:  Hyperthyroidism      High cholesterol      Hypertension      Hypertension      History of cholecystectomy      H/O lumpectomy        MEDICATIONS  (STANDING):  atorvastatin 10 milliGRAM(s) Oral at bedtime  heparin   Injectable 5000 Unit(s) SubCutaneous every 12 hours  lidocaine 2% Gel 1 Application(s) Topical daily  methimazole 10 milliGRAM(s) Oral <User Schedule>  multivitamin 1 Tablet(s) Oral daily  pantoprazole   Suspension 40 milliGRAM(s) Oral daily  sodium chloride 0.9%. 1000 milliLiter(s) (70 mL/Hr) IV Continuous <Continuous>    MEDICATIONS  (PRN):  acetaminophen     Tablet .. 650 milliGRAM(s) Oral every 6 hours PRN Temp greater or equal to 38C (100.4F), Mild Pain (1 - 3)  traMADol 50 milliGRAM(s) Oral every 6 hours PRN Severe Pain (7 - 10)    FAMILY HISTORY:  No pertinent family history in first degree relatives       No family history of acute MI or sudden cardiac death.    SOCIAL HISTORY: No active tobacco, ethanol, or drug abuse.    REVIEW OF SYSTEMS   All other review of systems is negative unless indicated above.    VITAL SIGNS:   Vital Signs Last 24 Hrs  T(C): 36.5 (22 Sep 2024 05:22), Max: 36.6 (21 Sep 2024 21:01)  T(F): 97.7 (22 Sep 2024 05:22), Max: 97.9 (21 Sep 2024 21:01)  HR: 72 (22 Sep 2024 05:22) (61 - 88)  BP: 134/73 (22 Sep 2024 05:22) (55/35 - 137/92)  BP(mean): --  RR: 18 (22 Sep 2024 05:22) (18 - 18)  SpO2: 94% (22 Sep 2024 05:22) (94% - 99%)    Parameters below as of 22 Sep 2024 05:22  Patient On (Oxygen Delivery Method): room air        Physical Exam:  Constitutional: NAD, awake and alert, Obese   HEENT: Moist Mucous Membranes, Anicteric  Pulmonary: Non-labored, breath sounds are clear bilaterally, No wheezing, rales or rhonchi  Cardiovascular: Regular, S1 and S2, No murmurs, No rubs, gallops or clicks  Gastrointestinal: Bowel Sounds present, soft, nontender.   Lymph: No peripheral edema. No lymphadenopathy.  Skin: No visible rashes or ulcers.  Psych:  Mood & affect appropriate    I&O's Summary      LABS: All Labs Reviewed:                        12.5   8.05  )-----------( 293      ( 21 Sep 2024 16:05 )             38.1     21 Sep 2024 16:05    136    |  108    |  16     ----------------------------<  224    4.1     |  19     |  1.50     Ca    9.4        21 Sep 2024 16:05    TPro  7.9    /  Alb  3.2    /  TBili  0.5    /  DBili  x      /  AST  25     /  ALT  24     /  AlkPhos  132    21 Sep 2024 16:05    PT/INR - ( 21 Sep 2024 16:05 )   PT: 11.6 sec;   INR: 1.02 ratio         PTT - ( 21 Sep 2024 16:05 )  PTT:37.3 secTroponin I, High Sensitivity Result: 11.4 ng/L (09-21-24 @ 16:05)  Troponin I, High Sensitivity Result: 10.8 ng/L (09-14-24 @ 01:40)  Thyroid Stimulating Hormone, Serum: 0.18 uIU/mL (09-21-24 @ 16:05)      12 Lead ECG:   Ventricular Rate 62 BPM    Atrial Rate 62 BPM    P-R Interval 142 ms    QRS Duration 86 ms    Q-T Interval 380 ms    QTC Calculation(Bazett) 385 ms    P Axis 55 degrees    R Axis 35 degrees    T Axis 28 degrees    Diagnosis Line Normal sinus rhythm  Normal ECG  No previous ECGs available  Confirmed by FABIANO DAVID ([id number]) on 9/14/2024 8:48:41 PM (09-14-24 @ 01:25)

## 2024-09-22 NOTE — CONSULT NOTE ADULT - SUBJECTIVE AND OBJECTIVE BOX
All records reviewed.  seen w daughter present    HPI:    69 yo woman w hyper/hypothyroid on methimazole known of enlarged thyroid for years and has delayed surgery(now planned and has surgery followup), Stage IA [id number]  left breast ca 2017 post lumpectomy [facility] (6mm w additional DCIS, ERPR positive, Wwh6Lsu negative) post adj RT and 5yrs of hormne prophylaxis(could not recall name)  Pt has been taking care of ill husbnad who recently  from cancer and neglicting own care  Recently had mammo as outpatient(new Radiology facility, has not gone for mammo since COVID pandemic), and found left breast abnormality for MRI and bx next Tuesday.  Incr mid back pain x 2 wks, attributed to lifting and moving ill . seen last  in ER for sever back pain(), then PCP on , given 2 new meds, pain subsided on Monday but w the medicaton incr weakness, unsteadiness, and now adm to [facility]  w/u-  24 CTQ chest- no PE, marked enlarged thyroid w low attenuation and calcif. Left breast 5x4.2cm carter rim-enhancing fluid attenuation density, left breast skini thickening. Heterogenous vertebra recommend Bone Scan.  24 CT head no acute findings  24 CT c/a/p again seen the marked enlarged mutinodular thyroid, left breast 4.8cm fluid atten lesion felt could be post op seroma. Bone w scoliosis/degen changes. Liver periportal edema, Pancreas sm calcif    GYN hx-menses onset age 10/11, [id number]  first delivery age 19      PAST MEDICAL & SURGICAL HISTORY:  Hyperthyroidism      High cholesterol      Hypertension      History of cholecystectomy      H/O lumpectomy          Review of System:  see abpve    MEDICATIONS  (STANDING):  atorvastatin 10 milliGRAM(s) Oral at bedtime  heparin   Injectable 5000 Unit(s) SubCutaneous every 12 hours  lidocaine 2% Gel 1 Application(s) Topical daily  methimazole 10 milliGRAM(s) Oral <User Schedule>  multivitamin 1 Tablet(s) Oral daily  pantoprazole   Suspension 40 milliGRAM(s) Oral daily  sodium chloride 0.9%. 1000 milliLiter(s) (70 mL/Hr) IV Continuous <Continuous>    MEDICATIONS  (PRN):  acetaminophen     Tablet .. 650 milliGRAM(s) Oral every 6 hours PRN Temp greater or equal to 38C (100.4F), Mild Pain (1 - 3)  melatonin 5 milliGRAM(s) Oral at bedtime PRN Insomnia  traMADol 50 milliGRAM(s) Oral every 6 hours PRN Severe Pain (7 - 10)      Allergies    No Known Allergies    Intolerances        SOCIAL HISTORY:  never Etoh or tobacco    FAMILY HISTORY:  No pertinent family history in first degree relatives        Vital Signs Last 24 Hrs  T(C): 36.5 (22 Sep 2024 05:22), Max: 36.6 (21 Sep 2024 21:01)  T(F): 97.7 (22 Sep 2024 05:22), Max: 97.9 (21 Sep 2024 21:01)  HR: 72 (22 Sep 2024 05:22) (61 - 88)  BP: 134/73 (22 Sep 2024 05:22) (55/35 - 137/92)  BP(mean): --  RR: 18 (22 Sep 2024 05:22) (18 - 18)  SpO2: 94% (22 Sep 2024 05:22) (94% - 99%)    Parameters below as of 22 Sep 2024 05:22  Patient On (Oxygen Delivery Method): room air        PHYSICAL EXAM:      General: in no acute distress  Eyes:sclera anicteric, pupils equal and EOMI  ENMT:buccal mucosa moist, nose midline  Neck:supple, trachea midline  Lungs:clear, no wheeze/rhonchi  Cardiovascular:regular rate and rhythm, S1 S2  Abdomen:soft, nontender, no organomegaly present, bowel sounds normal  Neurological:alert and oriented x3, Cranial Nerves II-XII grossly intact  Skin:no increased ecchymosis/petechiae/purpura  Lymph Nodes:no palpable cervical/supraclavicular lymph nodes enlargements  Extremities:no cyanosis/clubbing/edema        LABS:                        13.6   8.57  )-----------( 263      ( 22 Sep 2024 06:05 )             39.6      @ 06:05  WBC8.57  RBC4.50 Hgb13.6 Hct39.6  MCV88.0  Hyrp451  Auto Esbjpe84.1 Band-- Auto Lymph19.7 Atypical Lymph-- Reactive Lymph-- Auto Mono7.2 Auto Eos2.6 Auto Baso0.2         @ 16:05  WBC8.05  RBC4.28 Hgb12.5 Hct38.1  MCV89.0  Uflx761  Auto Fisuma75.3 Band-- Auto Lymph26.3 Atypical Lymph-- Reactive Lymph-- Auto Mono6.3 Auto Eos6.3 Auto Baso0.4              144  |  113[H]  |  11  ----------------------------<  93  3.5   |  24  |  1.10    Ca    9.5      22 Sep 2024 06:05    TPro  8.4[H]  /  Alb  3.5  /  TBili  0.8  /  DBili  x   /  AST  38[H]  /  ALT  48  /  AlkPhos  147[H]   @ 16:05  PT11.6 INR1.02  PTT37.3    Urinalysis Basic - ( 22 Sep 2024 06:05 )    Color: x / Appearance: x / SG: x / pH: x  Gluc: 93 mg/dL / Ketone: x  / Bili: x / Urobili: x   Blood: x / Protein: x / Nitrite: x   Leuk Esterase: x / RBC: x / WBC x   Sq Epi: x / Non Sq Epi: x / Bacteria: x        PERIPHERAL BLOOD SMEAR REVIEW:      RADIOLOGY & ADDITIONAL STUDIES:  < from: CT Chest No Cont (24 @ 18:10) >    ACC: [id number] EXAM:  CT CHEST   ORDERED BY: ADELA MCINTOSH     ACC: [id number] EXAM:  CT ABDOMEN AND PELVIS   ORDERED BY: ADELA MCINTOSH     PROCEDURE DATE:  2024          INTERPRETATION:  CLINICAL INFORMATION: Weakness    COMPARISON: 2024    CONTRAST/COMPLICATIONS:  IV Contrast: NONE  Oral Contrast: NONE  Complications: None reported at time of study completion    PROCEDURE:  CT of the Chest, Abdomen and Pelvis was performed.  Sagittal and coronal reformats were performed.    FINDINGS:  CHEST:  LUNGS AND LARGE AIRWAYS: Apparent narrowing of cervical tracheal likely   due to expiratory phase and compression by enlarged thyroid gland. A   couple of subcentimeter calcified granulomas. No pulmonary consolidation   or suspicious nodules.  PLEURA: No pleural effusion.  VESSELS: No thoracic aortic aneurysm.  HEART: Heart size is normal. No pericardial effusion.  MEDIASTINUM AND BAILEY: Mildly enlarged right paratracheal lymph nodes.  CHEST WALL AND LOWER NECK: Markedly enlarged multinodular thyroid again   noted. 4.8 cm fluid attenuation lesion in the left breast, probably   postoperative seroma. Skin thickening in the left breast.    ABDOMEN AND PELVIS:  LIVER: Suspected periportal edema. No discrete mass..  BILE DUCTS: Normalcaliber.  GALLBLADDER: Cholecystectomy.  SPLEEN: Within normal limits.  PANCREAS: A few small pancreatic calcifications, otherwise unremarkable.  ADRENALS: No discrete adrenal nodules.  KIDNEYS/URETERS: No hydronephrosis or nephrolithiasis.    BLADDER: Within normal limits.  REPRODUCTIVE ORGANS: Uterus and adnexa within normal limits.    BOWEL: No bowel obstruction. Appendix is not visualized.  PERITONEUM/RETROPERITONEUM: Trace perihepatic ascites..  VESSELS: No abdominal aortic aneurysm.  LYMPH NODES: No lymphadenopathy.  ABDOMINAL WALL: Within normal limits.  BONES: Scoliosis and degenerative changes.        IMPRESSION:    Enlarged multinodular thyroid gland. Mild mediastinal lymphadenopathy.   Trace ascites. Possible periportal edema. No other significant acute   abnormality is identified in this limited study without contrast.    --- End of Report ---      < end of copied text >  < from: CT Head No Cont (24 @ 18:06) >  ACC: [id number] EXAM:  CT BRAIN   ORDERED BY: ADELA MCINTOSH     PROCEDURE DATE:  2024          INTERPRETATION:  CLINICAL INFORMATION: weak    COMPARISON: None.      TECHNIQUE:  Serial axial images were obtained from the skull base to the   vertex using multi-slice helical technique. Sagittal and coronal   reformats were obtained.    FINDINGS:    VENTRICLES AND SULCI: Normal in size and configuration.  INTRA-AXIAL: No mass effect, acute hemorrhage, or midline shift.  EXTRA-AXIAL: No mass or fluid collection. Basal cisterns are normal in   appearance.    VISUALIZED SINUSES:  Clear.  TYMPANOMASTOID CAVITIES:  Clear.  VISUALIZED ORBITS: Bilateral optic globe proptosis. Correlate clinically   for presence of underlying thyroid ophthalmology.  CALVARIUM: Intact.    MISCELLANEOUS: A partially empty sella turcica sella turcica is   incidentally noted.      IMPRESSION:  1. No acute intracranial hemorrhage, mass effect, or midline shift. If   weakness persists, consider further evaluation via MR imaging to include   DWI and ADC mapping techniques, provided there are no contraindications.  2. Bilateral optic globe proptosis. Correlate clinically for presence of   underlying thyroid ophthalmology.      --- End of Report ---        < end of copied text >  < from: CT Angio Chest PE Protocol w/ IV Cont (24 @ 05:50) >    ACC: [id number] EXAM:  CT ANGIO CHEST PULM ART [facility]   ORDERED BY:   PAPO NAVARRO     PROCEDURE DATE:  2024          INTERPRETATION:  CLINICAL INFORMATION: Back pain/injury. History of   breast malignancy.    COMPARISON: None.    CONTRAST/COMPLICATIONS:  IV Contrast: Omnipaque 350  70 cc administered   30 cc discarded  Oral Contrast: NONE  Complications: None reported at time of study completion    PROCEDURE:  CT Angiography of the Chest.  Sagittal and coronal reformats were performed as well as 3D (MIP)   reconstructions.    FINDINGS:    LUNGS AND LARGE AIRWAYS: Patent central airways. Mild mosaic ground glass   attenuation. This may be reflective of small airway disease or air   trapping. Atypical infection considered in differential.  PLEURA: No pleural effusion.  VESSELS: No pulmonary embolism. Aorta and coronary artery calcifications.  HEART: Heart size is normal. No pericardial effusion.  MEDIASTINUM AND BAILEY: No lymphadenopathy.  CHEST WALL AND LOWER NECK: Left breastperipheral rim-enhancing fluid   attenuated density measuring 5 x 4.2 cm. Left breast skin thickening   identified. Marked enlargement thyroid lobes containing low areas of   attenuation and calcifications, likely multinodular goiter. Consider   nonemergent thyroid ultrasound.  VISUALIZED UPPER ABDOMEN: Within normal limits.  BONES: Degenerative changes. Heterogeneity of the vertebral bodies.   Consider nonemergent bone scan to exclude osseous metastasis.    IMPRESSION:    No pulmonary embolism.    Mild mosaic ground glass attenuation. This may be reflective of small   airway disease or air trapping. Atypical infection considered in   differential.    Left breast peripheral rim-enhancing fluid attenuated density measuring 5   x 4.2 cm. Left breast skin thickening. Consider nonemergent breast   imaging follow-up if not performed previously.    Marked enlargement thyroid lobes containing low areas of attenuation and   calcifications, likely multinodular goiter. Consider nonemergent thyroid   ultrasound.    Heterogeneity of the vertebral bodies. Consider nonemergent bone scan to   exclude osseous metastasis.      --- End of Report ---          < end of copied text >

## 2024-09-22 NOTE — PROGRESS NOTE ADULT - SUBJECTIVE AND OBJECTIVE BOX
Patient is a 68y old  Female who presents with a chief complaint of hypotension (22 Sep 2024 11:08)      INTERVAL HPI/OVERNIGHT EVENTS: No acute events overnight. Pt was seen and examined at the bedside this AM. Daughter at bedside given update. States her mom is more confused recently and has had a deterioration in health since losing her  earlier this year. Pt endorsing improvement in back pain with current pain regimen. Endorsing mild congestion. Denies fever, chills, n/v, cough, sob.     MEDICATIONS  (STANDING):  atorvastatin 10 milliGRAM(s) Oral at bedtime  heparin   Injectable 5000 Unit(s) SubCutaneous every 12 hours  lidocaine 2% Gel 1 Application(s) Topical daily  methimazole 10 milliGRAM(s) Oral <User Schedule>  multivitamin 1 Tablet(s) Oral daily  pantoprazole   Suspension 40 milliGRAM(s) Oral daily  sodium chloride 0.9%. 1000 milliLiter(s) (70 mL/Hr) IV Continuous <Continuous>    MEDICATIONS  (PRN):  acetaminophen     Tablet .. 650 milliGRAM(s) Oral every 6 hours PRN Temp greater or equal to 38C (100.4F), Mild Pain (1 - 3)  melatonin 5 milliGRAM(s) Oral at bedtime PRN Insomnia  traMADol 50 milliGRAM(s) Oral every 6 hours PRN Severe Pain (7 - 10)      Allergies    No Known Allergies    Intolerances        REVIEW OF SYSTEMS:  CONSTITUTIONAL: +fatigue; No fever or chills  HEENT: No headache, no sore throat  RESPIRATORY: +congestion; No cough, wheezing, or shortness of breath  CARDIOVASCULAR: No chest pain, palpitations  GASTROINTESTINAL: No abd pain, nausea, vomiting, or diarrhea  GENITOURINARY: No dysuria, frequency, or hematuria  NEUROLOGICAL: no focal weakness or dizziness  MUSCULOSKELETAL: no myalgias   INTEGUMENTARY:     Vital Signs Last 24 Hrs  T(C): 36.5 (22 Sep 2024 05:22), Max: 36.6 (21 Sep 2024 21:01)  T(F): 97.7 (22 Sep 2024 05:22), Max: 97.9 (21 Sep 2024 21:01)  HR: 72 (22 Sep 2024 05:22) (61 - 88)  BP: 134/73 (22 Sep 2024 05:22) (55/35 - 137/92)  BP(mean): --  RR: 18 (22 Sep 2024 05:22) (18 - 18)  SpO2: 94% (22 Sep 2024 05:22) (94% - 99%)    Parameters below as of 22 Sep 2024 05:22  Patient On (Oxygen Delivery Method): room air        PHYSICAL EXAM:  GENERAL: NAD  HEENT:  anicteric, moist mucous membranes  CHEST/LUNG:  CTA b/l, no rales, wheezes, or rhonchi  HEART:  RRR, S1, S2  ABDOMEN:  BS+, soft, nontender, nondistended  MUSCULOSKELETAL: no edema, cyanosis, or calf tenderness  NEUROLOGIC: answers questions and follows commands appropriately      LABS:                        13.6   8.57  )-----------( 263      ( 22 Sep 2024 06:05 )             39.6     CBC Full  -  ( 22 Sep 2024 06:05 )  WBC Count : 8.57 K/uL  Hemoglobin : 13.6 g/dL  Hematocrit : 39.6 %  Platelet Count - Automated : 263 K/uL  Mean Cell Volume : 88.0 fl  Mean Cell Hemoglobin : 30.2 pg  Mean Cell Hemoglobin Concentration : 34.3 gm/dL  Auto Neutrophil # : 6.00 K/uL  Auto Lymphocyte # : 1.69 K/uL  Auto Monocyte # : 0.62 K/uL  Auto Eosinophil # : 0.22 K/uL  Auto Basophil # : 0.02 K/uL  Auto Neutrophil % : 70.1 %  Auto Lymphocyte % : 19.7 %  Auto Monocyte % : 7.2 %  Auto Eosinophil % : 2.6 %  Auto Basophil % : 0.2 %    22 Sep 2024 06:05    144    |  113    |  11     ----------------------------<  93     3.5     |  24     |  1.10     Ca    9.5        22 Sep 2024 06:05    TPro  8.4    /  Alb  3.5    /  TBili  0.8    /  DBili  x      /  AST  38     /  ALT  48     /  AlkPhos  147    22 Sep 2024 06:05    PT/INR - ( 21 Sep 2024 16:05 )   PT: 11.6 sec;   INR: 1.02 ratio         PTT - ( 21 Sep 2024 16:05 )  PTT:37.3 sec  Urinalysis Basic - ( 22 Sep 2024 06:05 )    Color: x / Appearance: x / SG: x / pH: x  Gluc: 93 mg/dL / Ketone: x  / Bili: x / Urobili: x   Blood: x / Protein: x / Nitrite: x   Leuk Esterase: x / RBC: x / WBC x   Sq Epi: x / Non Sq Epi: x / Bacteria: x      CAPILLARY BLOOD GLUCOSE      POCT Blood Glucose.: 225 mg/dL (21 Sep 2024 16:11)        Urinalysis with Rflx Culture (collected 09-21-24 @ 18:15)        RADIOLOGY & ADDITIONAL TESTS:    Personally reviewed.     Consultant(s) Notes Reviewed:  [x] YES  [ ] NO     Patient is a 68y old  Female who presents with a chief complaint of hypotension (22 Sep 2024 11:08)      INTERVAL HPI/OVERNIGHT EVENTS: No acute events overnight. Pt was seen and examined at the bedside this AM. Daughter at bedside given update. States her mom is more confused recently and has had a deterioration in health since losing her  earlier this year. Pt endorsing improvement in back pain with current pain regimen. Endorsing mild congestion. Denies fever, chills, n/v, cough, sob.     MEDICATIONS  (STANDING):  atorvastatin 10 milliGRAM(s) Oral at bedtime  heparin   Injectable 5000 Unit(s) SubCutaneous every 12 hours  lidocaine 2% Gel 1 Application(s) Topical daily  methimazole 10 milliGRAM(s) Oral <User Schedule>  multivitamin 1 Tablet(s) Oral daily  pantoprazole   Suspension 40 milliGRAM(s) Oral daily  sodium chloride 0.9%. 1000 milliLiter(s) (70 mL/Hr) IV Continuous <Continuous>    MEDICATIONS  (PRN):  acetaminophen     Tablet .. 650 milliGRAM(s) Oral every 6 hours PRN Temp greater or equal to 38C (100.4F), Mild Pain (1 - 3)  melatonin 5 milliGRAM(s) Oral at bedtime PRN Insomnia  traMADol 50 milliGRAM(s) Oral every 6 hours PRN Severe Pain (7 - 10)      Allergies    No Known Allergies    Intolerances      REVIEW OF SYSTEMS:  CONSTITUTIONAL: +fatigue; No fever or chills  HEENT: No headache, no sore throat  RESPIRATORY: +congestion; No cough, wheezing, or shortness of breath  CARDIOVASCULAR: No chest pain, palpitations  GASTROINTESTINAL: No abd pain, nausea, vomiting, or diarrhea  GENITOURINARY: No dysuria, frequency, or hematuria  NEUROLOGICAL: no focal weakness or dizziness  MUSCULOSKELETAL: no myalgias   INTEGUMENTARY:     Vital Signs Last 24 Hrs  T(C): 36.5 (22 Sep 2024 05:22), Max: 36.6 (21 Sep 2024 21:01)  T(F): 97.7 (22 Sep 2024 05:22), Max: 97.9 (21 Sep 2024 21:01)  HR: 72 (22 Sep 2024 05:22) (61 - 88)  BP: 134/73 (22 Sep 2024 05:22) (55/35 - 137/92)  BP(mean): --  RR: 18 (22 Sep 2024 05:22) (18 - 18)  SpO2: 94% (22 Sep 2024 05:22) (94% - 99%)    Parameters below as of 22 Sep 2024 05:22  Patient On (Oxygen Delivery Method): room air      PHYSICAL EXAM:  GENERAL: NAD  HEENT:  anicteric, moist mucous membranes  CHEST/LUNG:  CTA b/l, no rales, wheezes, or rhonchi  HEART:  RRR, S1, S2  ABDOMEN:  BS+, soft, nontender, nondistended  MUSCULOSKELETAL: no edema, cyanosis, or calf tenderness  NEUROLOGIC: answers questions and follows commands appropriately      LABS:                        13.6   8.57  )-----------( 263      ( 22 Sep 2024 06:05 )             39.6     CBC Full  -  ( 22 Sep 2024 06:05 )  WBC Count : 8.57 K/uL  Hemoglobin : 13.6 g/dL  Hematocrit : 39.6 %  Platelet Count - Automated : 263 K/uL  Mean Cell Volume : 88.0 fl  Mean Cell Hemoglobin : 30.2 pg  Mean Cell Hemoglobin Concentration : 34.3 gm/dL  Auto Neutrophil # : 6.00 K/uL  Auto Lymphocyte # : 1.69 K/uL  Auto Monocyte # : 0.62 K/uL  Auto Eosinophil # : 0.22 K/uL  Auto Basophil # : 0.02 K/uL  Auto Neutrophil % : 70.1 %  Auto Lymphocyte % : 19.7 %  Auto Monocyte % : 7.2 %  Auto Eosinophil % : 2.6 %  Auto Basophil % : 0.2 %    22 Sep 2024 06:05    144    |  113    |  11     ----------------------------<  93     3.5     |  24     |  1.10     Ca    9.5        22 Sep 2024 06:05    TPro  8.4    /  Alb  3.5    /  TBili  0.8    /  DBili  x      /  AST  38     /  ALT  48     /  AlkPhos  147    22 Sep 2024 06:05    PT/INR - ( 21 Sep 2024 16:05 )   PT: 11.6 sec;   INR: 1.02 ratio         PTT - ( 21 Sep 2024 16:05 )  PTT:37.3 sec  Urinalysis Basic - ( 22 Sep 2024 06:05 )    Color: x / Appearance: x / SG: x / pH: x  Gluc: 93 mg/dL / Ketone: x  / Bili: x / Urobili: x   Blood: x / Protein: x / Nitrite: x   Leuk Esterase: x / RBC: x / WBC x   Sq Epi: x / Non Sq Epi: x / Bacteria: x      CAPILLARY BLOOD GLUCOSE      POCT Blood Glucose.: 225 mg/dL (21 Sep 2024 16:11)        Urinalysis with Rflx Culture (collected 09-21-24 @ 18:15)        RADIOLOGY & ADDITIONAL TESTS:    Personally reviewed.     Consultant(s) Notes Reviewed:  [x] YES  [ ] NO

## 2024-09-22 NOTE — CONSULT NOTE ADULT - ASSESSMENT
68-year-old female with a PMHx of hyperthyroidism on methimazole, hypertension, breast CA 2011 s/p lumpectomy, chemo, radiation, and HLD presents to the ED BIBEMS with weakness admitted for hypotension, new onset afib, and supportive care for Entero/Rhinovirus.     - Pt found to have new onset A-fib in ER  - No AC for now despite CHADVASC score 3--> can be in setting of critical illness (low BP, rhino viral infection)  - EKG Afib @ 52  - Tele w/     - BP low on admission to 55/35, S/p IVF  - BP now improved  - Holding antihypertensives   - On atenolol, losartan and nifedipine at home    - EKG w/ A fib @ 52  - Troponin: <-11.4  - No evidence of any active ischemia   - Continue statin     - Pt found to have positive RVP on admission for enterovirus  - Continue supportive measures and NS IV fluids    - Pt has hx of breast cancer and previous CTA from 1 week ago is concerning for possible bony metastasis   - Back pain may be 2/2 mets  - Pt was scheduled for outpt MRI and breast biopsy  - Pain control   - Heme/onc consult  - Management of hyperthyroidism per primary. Endocrine consulted   - [place] 1.5 on admission, likely prerenal. continue to trend     - Monitor and replete lytes, keep K>4, Mg>2.  - Will continue to follow.    Lorri Morales, MS FNP, AGACNP  Nurse Practitioner- Cardiology   Please call on TEAMS     68-year-old female with a PMHx of hyperthyroidism on methimazole, hypertension, breast CA 2011 s/p lumpectomy, chemo, radiation, and HLD presents to the ED BIBEMS with weakness admitted for hypotension, new onset afib, and supportive care for Entero/Rhinovirus.     - Pt found to have new onset A-fib in ER  - No AC for now despite CHADVASC score 3--> can be in setting of critical illness (low BP, rhino viral infection)  - EKG Afib @ 52  - EKG 9/22 am with SR @ 85  - Tele w/     - BP low on admission to 55/35, S/p IVF  - BP now improved  - Holding antihypertensives   - On atenolol, losartan and nifedipine at home    - EKG w/ A fib @ 52  - EKG 9/22 am with SR @ 85  - Troponin: <-11.4  - No evidence of any active ischemia   - Continue statin     - Pt found to have positive RVP on admission for enterovirus  - Continue supportive measures and NS IV fluids    - Pt has hx of breast cancer and previous CTA from 1 week ago is concerning for possible bony metastasis   - Back pain may be 2/2 mets  - Pt was scheduled for outpt MRI and breast biopsy  - Pain control   - Heme/onc consult  - Management of hyperthyroidism per primary. Endocrine consulted   - Craetin[place] 1.5 on admission, likely prerenal. continue to trend     - Monitor and replete lytes, keep K>4, Mg>2.  - Will continue to follow.    Lorri Morales, MS FNP, AGACNP  Nurse Practitioner- Cardiology   Please call on TEAMS     68-year-old female with a PMHx of hyperthyroidism on methimazole, hypertension, breast CA 2011 s/p lumpectomy, chemo, radiation, and HLD presents to the ED BIBEMS with weakness admitted for hypotension, new onset afib, and supportive care for Entero/Rhinovirus.     - Pt found to have new onset A-fib in ER, was in A fib in ER, but converted to SR 9/21 by the time she came to tele.   - No AC for now despite CHADVASC score 3--> can be in setting of critical illness (low BP, rhino viral infection)  - EKG Afib @ 52  - EKG 9/22 am with SR @ 85  - Tele w/ SR 60-80s, nonsustained 100s x 1   - Not on AV sanna agents     - BP low on admission to 55/35, S/p IVF  - BP now improved  - Holding antihypertensives   - On atenolol, losartan and nifedipine at home    - EKG w/ A fib @ 52  - EKG 9/22 am with SR @ 85  - Troponin: <-11.4  - No evidence of any active ischemia   - Continue statin     - Pt found to have positive RVP on admission for enterovirus  - Continue supportive measures and NS IV fluids    - Pt has hx of breast cancer and previous CTA from 1 week ago is concerning for possible bony metastasis   - Back pain may be 2/2 mets  - Pt was scheduled for outpt MRI and breast biopsy  - Pain control   - Heme/onc consult  - Management of hyperthyroidism per primary. Endocrine consulted   - Creatinine 1.5 on admission, likely prerenal, continue to trend     - Monitor and replete lytes, keep K>4, Mg>2.  - Will continue to follow.    Lorri Morales, MS FNP, AGACNP  Nurse Practitioner- Cardiology   Please call on TEAMS     68-year-old female with a PMHx of hyperthyroidism on methimazole, hypertension, breast CA 2011 s/p lumpectomy, chemo, radiation, and HLD presents to the ED BIBEMS with weakness admitted for hypotension, new onset afib, and supportive care for Entero/Rhinovirus.     - Pt found to have new onset A-fib in ER, was in A fib in ER, but converted to SR 9/21 by the time she came to tele.   - Likely in the setting of hyperthyroidism and viral infection   - EKG Afib @ 52  - EKG 9/22 am with SR @ 85  - Tele w/ SR 60-80s, nonsustained 100s x 1   - Not on AV sanna agents   - Risk for thromboembolism is elevated given h/o cancer.    - BP low on admission to 55/35, S/p IVF  - BP now improved  - Holding antihypertensives   - On atenolol, losartan and nifedipine at home    - EKG w/ A fib @ 52  - EKG 9/22 am with SR @ 85  - Troponin: <-11.4  - No evidence of any active ischemia   - Continue statin     - Pt found to have positive RVP on admission for enterovirus  - Continue supportive measures and NS IV fluids    - Pt has hx of breast cancer and previous CTA from 1 week ago is concerning for possible bony metastasis   - Back pain may be 2/2 mets  - Pt was scheduled for out pt MRI and breast biopsy  - Pain control   - Heme/onc consult  - Management of hyperthyroidism per primary. Endocrine consulted   - Creatinine 1.5 on admission, likely prerenal, continue to trend     - Monitor and replete lytes, keep K>4, Mg>2.  - Will continue to follow.    Lorri Morales, MS FNP, AGACNP  Nurse Practitioner- Cardiology   Please call on TEAMS

## 2024-09-22 NOTE — PROGRESS NOTE ADULT - PROBLEM SELECTOR PLAN 1
Pt BIBEMS to the ED due to sudden weakness, feeling clammy and loss of vision  -Admit to telemetry  -Pt has hx of HTN on multiple BP meds see below   -In ED BP was 55/35, given 2 L bolus. BP fluid responsive--> inc to 80/56-->  137/92  -Hold home BP meds  -Continue to monitor hemodynamics and new/worsening symptoms  -Neuro to be consulted tomorrow; awaiting recs Pt BIBEMS to the ED due to sudden weakness, feeling clammy and loss of vision. suspect 2/2 dehydration, vs URI, vs medication induced  -Pt has hx of HTN on multiple BP meds see below   -In ED BP was 55/35, given 2 L bolus. BP fluid responsive--> inc to 80/56-->  137/92  -Hold home BP meds  -Continue to monitor hemodynamics and new/worsening symptoms  -Neuro to be consulted tomorrow; awaiting recs

## 2024-09-23 LAB
ALBUMIN SERPL ELPH-MCNC: 3 G/DL — LOW (ref 3.3–5)
ALP SERPL-CCNC: 126 U/L — HIGH (ref 40–120)
ALT FLD-CCNC: 34 U/L — SIGNIFICANT CHANGE UP (ref 12–78)
ANION GAP SERPL CALC-SCNC: 5 MMOL/L — SIGNIFICANT CHANGE UP (ref 5–17)
AST SERPL-CCNC: 28 U/L — SIGNIFICANT CHANGE UP (ref 15–37)
BASOPHILS # BLD AUTO: 0.03 K/UL — SIGNIFICANT CHANGE UP (ref 0–0.2)
BASOPHILS NFR BLD AUTO: 0.4 % — SIGNIFICANT CHANGE UP (ref 0–2)
BILIRUB SERPL-MCNC: 0.8 MG/DL — SIGNIFICANT CHANGE UP (ref 0.2–1.2)
BUN SERPL-MCNC: 9 MG/DL — SIGNIFICANT CHANGE UP (ref 7–23)
CALCIUM SERPL-MCNC: 9.4 MG/DL — SIGNIFICANT CHANGE UP (ref 8.5–10.1)
CANCER AG27-29 SERPL-ACNC: 22.8 U/ML — SIGNIFICANT CHANGE UP (ref 0–38.6)
CHLORIDE SERPL-SCNC: 114 MMOL/L — HIGH (ref 96–108)
CO2 SERPL-SCNC: 23 MMOL/L — SIGNIFICANT CHANGE UP (ref 22–31)
CREAT SERPL-MCNC: 0.9 MG/DL — SIGNIFICANT CHANGE UP (ref 0.5–1.3)
EGFR: 70 ML/MIN/1.73M2 — SIGNIFICANT CHANGE UP
EOSINOPHIL # BLD AUTO: 0.27 K/UL — SIGNIFICANT CHANGE UP (ref 0–0.5)
EOSINOPHIL NFR BLD AUTO: 4 % — SIGNIFICANT CHANGE UP (ref 0–6)
GLUCOSE SERPL-MCNC: 92 MG/DL — SIGNIFICANT CHANGE UP (ref 70–99)
HCT VFR BLD CALC: 35.4 % — SIGNIFICANT CHANGE UP (ref 34.5–45)
HGB BLD-MCNC: 12.2 G/DL — SIGNIFICANT CHANGE UP (ref 11.5–15.5)
IMM GRANULOCYTES NFR BLD AUTO: 0.1 % — SIGNIFICANT CHANGE UP (ref 0–0.9)
LYMPHOCYTES # BLD AUTO: 1.29 K/UL — SIGNIFICANT CHANGE UP (ref 1–3.3)
LYMPHOCYTES # BLD AUTO: 19.3 % — SIGNIFICANT CHANGE UP (ref 13–44)
MCHC RBC-ENTMCNC: 29.8 PG — SIGNIFICANT CHANGE UP (ref 27–34)
MCHC RBC-ENTMCNC: 34.5 GM/DL — SIGNIFICANT CHANGE UP (ref 32–36)
MCV RBC AUTO: 86.6 FL — SIGNIFICANT CHANGE UP (ref 80–100)
MONOCYTES # BLD AUTO: 0.58 K/UL — SIGNIFICANT CHANGE UP (ref 0–0.9)
MONOCYTES NFR BLD AUTO: 8.7 % — SIGNIFICANT CHANGE UP (ref 2–14)
NEUTROPHILS # BLD AUTO: 4.52 K/UL — SIGNIFICANT CHANGE UP (ref 1.8–7.4)
NEUTROPHILS NFR BLD AUTO: 67.5 % — SIGNIFICANT CHANGE UP (ref 43–77)
NRBC # BLD: 0 /100 WBCS — SIGNIFICANT CHANGE UP (ref 0–0)
PLATELET # BLD AUTO: 200 K/UL — SIGNIFICANT CHANGE UP (ref 150–400)
POTASSIUM SERPL-MCNC: 3.7 MMOL/L — SIGNIFICANT CHANGE UP (ref 3.5–5.3)
POTASSIUM SERPL-SCNC: 3.7 MMOL/L — SIGNIFICANT CHANGE UP (ref 3.5–5.3)
PROT SERPL-MCNC: 7.4 G/DL — SIGNIFICANT CHANGE UP (ref 6–8.3)
RBC # BLD: 4.09 M/UL — SIGNIFICANT CHANGE UP (ref 3.8–5.2)
RBC # FLD: 11.9 % — SIGNIFICANT CHANGE UP (ref 10.3–14.5)
SODIUM SERPL-SCNC: 142 MMOL/L — SIGNIFICANT CHANGE UP (ref 135–145)
WBC # BLD: 6.7 K/UL — SIGNIFICANT CHANGE UP (ref 3.8–10.5)
WBC # FLD AUTO: 6.7 K/UL — SIGNIFICANT CHANGE UP (ref 3.8–10.5)

## 2024-09-23 PROCEDURE — 78306 BONE IMAGING WHOLE BODY: CPT | Mod: 26

## 2024-09-23 PROCEDURE — 99233 SBSQ HOSP IP/OBS HIGH 50: CPT

## 2024-09-23 PROCEDURE — 70553 MRI BRAIN STEM W/O & W/DYE: CPT | Mod: 26

## 2024-09-23 PROCEDURE — 72157 MRI CHEST SPINE W/O & W/DYE: CPT | Mod: 26

## 2024-09-23 PROCEDURE — 99232 SBSQ HOSP IP/OBS MODERATE 35: CPT | Mod: GC

## 2024-09-23 RX ORDER — APIXABAN 5 MG/1
2.5 TABLET, FILM COATED ORAL EVERY 12 HOURS
Refills: 0 | Status: DISCONTINUED | OUTPATIENT
Start: 2024-09-23 | End: 2024-09-24

## 2024-09-23 RX ORDER — ATENOLOL 25 MG/1
25 TABLET ORAL DAILY
Refills: 0 | Status: DISCONTINUED | OUTPATIENT
Start: 2024-09-23 | End: 2024-09-24

## 2024-09-23 RX ADMIN — MULTI VITAMIN/MINERAL SUPPLEMENT WITH ASCORBIC ACID, NIACIN, PYRIDOXINE, PANTOTHENIC ACID, FOLIC ACID, RIBOFLAVIN, THIAMIN, BIOTIN, COBALAMIN AND ZINC. 1 TABLET(S): 60; 20; 12.5; 10; 10; 1.7; 1.5; 1; .3; .006 TABLET, COATED ORAL at 11:25

## 2024-09-23 RX ADMIN — PANTOPRAZOLE SODIUM 40 MILLIGRAM(S): 40 TABLET, DELAYED RELEASE ORAL at 11:25

## 2024-09-23 RX ADMIN — Medication 0.5 MILLIGRAM(S): at 18:08

## 2024-09-23 RX ADMIN — Medication 5000 UNIT(S): at 05:27

## 2024-09-23 RX ADMIN — Medication 5 MILLIGRAM(S): at 21:27

## 2024-09-23 RX ADMIN — APIXABAN 2.5 MILLIGRAM(S): 5 TABLET, FILM COATED ORAL at 17:32

## 2024-09-23 RX ADMIN — ATENOLOL 25 MILLIGRAM(S): 25 TABLET ORAL at 12:43

## 2024-09-23 RX ADMIN — ATORVASTATIN CALCIUM 10 MILLIGRAM(S): 10 TABLET, FILM COATED ORAL at 21:27

## 2024-09-23 NOTE — PROGRESS NOTE ADULT - SUBJECTIVE AND OBJECTIVE BOX
[INTERVAL HX: ]  Patient seen and examined;  Chart reviewed and events noted;     to go for MRI  s/p NM Bone scan    dtr at bedside    [MEDICATIONS]  MEDICATIONS  (STANDING):  apixaban 2.5 milliGRAM(s) Oral every 12 hours  atenolol  Tablet 25 milliGRAM(s) Oral daily  atorvastatin 10 milliGRAM(s) Oral at bedtime  lidocaine 2% Gel 1 Application(s) Topical daily  methimazole 15 milliGRAM(s) Oral daily  multivitamin 1 Tablet(s) Oral daily  pantoprazole   Suspension 40 milliGRAM(s) Oral daily  sodium chloride 0.9%. 1000 milliLiter(s) (70 mL/Hr) IV Continuous <Continuous>    MEDICATIONS  (PRN):  acetaminophen     Tablet .. 650 milliGRAM(s) Oral every 6 hours PRN Temp greater or equal to 38C (100.4F), Mild Pain (1 - 3)  LORazepam     Tablet 0.5 milliGRAM(s) Oral once PRN Anxiety  melatonin 5 milliGRAM(s) Oral at bedtime PRN Insomnia  traMADol 50 milliGRAM(s) Oral every 6 hours PRN Severe Pain (7 - 10)      [VITALS]  Vital Signs Last 24 Hrs  T(C): 36.6 (23 Sep 2024 13:10), Max: 36.9 (23 Sep 2024 05:11)  T(F): 97.8 (23 Sep 2024 13:10), Max: 98.4 (23 Sep 2024 05:11)  HR: 64 (23 Sep 2024 13:10) (63 - 67)  BP: 128/74 (23 Sep 2024 13:10) (128/74 - 165/78)  BP(mean): --  RR: 18 (23 Sep 2024 13:10) (18 - 19)  SpO2: 95% (23 Sep 2024 13:10) (94% - 95%)    Parameters below as of 23 Sep 2024 13:10  Patient On (Oxygen Delivery Method): room air      [WT/HT]  Daily     Daily Weight in k.5 (23 Sep 2024 05:11)  [VENT]      [PHYSICAL EXAM]  GEN: NAD  HEENT: normocephalic and atraumatic. EOMI. exophthalmos noted.    NECK: Supple.  No lymphadenopathy   LUNGS: Clear to auscultation.  HEART: Regular rate and rhythm,  no MRG  ABDOMEN: Soft, nontender, and nondistended.  Positive bowel sounds.    : No CVA tenderness  EXTREMITIES: Without edema.  NEUROLOGIC: grossly intact.  PSYCHIATRIC: Appropriate affect .  SKIN: No rash     [LABS:]                        12.2   6.70  )-----------( 200      ( 23 Sep 2024 07:45 )             35.4     09-  142  |  114[H]  |  9   ----------------------------<  92  3.7   |  23  |  0.90  Ca    9.4      23 Sep 2024 07:45  TPro  7.4  /  Alb  3.0[L]  /  TBili  0.8  /  DBili  x   /  AST  28  /  ALT  34  /  AlkPhos  126[H]  09-23    Urinalysis Basic - ( 23 Sep 2024 07:45 )  Color: x / Appearance: x / SG: x / pH: x  Gluc: 92 mg/dL / Ketone: x  / Bili: x / Urobili: x   Blood: x / Protein: x / Nitrite: x   Leuk Esterase: x / RBC: x / WBC x   Sq Epi: x / Non Sq Epi: x / Bacteria: x    Culture - Blood (collected 21 Sep 2024 18:50)  Source: .Blood Blood-Peripheral  Preliminary Report (23 Sep 2024 02:01):    No growth at 24 hours    Culture - Blood (collected 21 Sep 2024 18:40)  Source: .Blood Blood-Peripheral  Preliminary Report (23 Sep 2024 02:01):    No growth at 24 hours    Urinalysis with Rflx Culture (collected 21 Sep 2024 18:15)    SARS-CoV-2: NotDetec (21 Sep 2024 17:50)      Culture - Blood (collected 21 Sep 2024 18:50)  Source: .Blood Blood-Peripheral  Preliminary Report (23 Sep 2024 02:01):    No growth at 24 hours    Culture - Blood (collected 21 Sep 2024 18:40)  Source: .Blood Blood-Peripheral  Preliminary Report (23 Sep 2024 02:01):    No growth at 24 hours    Urinalysis with Rflx Culture (collected 21 Sep 2024 18:15)      [RADIOLOGY STUDIES:]

## 2024-09-23 NOTE — PROGRESS NOTE ADULT - PROBLEM SELECTOR PLAN 5
-Pt found to have positive RVP on admission for enterovirus  -S/p zosyn in ED  -Pt does not endorse any symptoms currently   -Pt afebrile, no leukocytosis   -Continue supportive measures and NS IV fluids  -Lactate 4.2 initially, now downtrending   -Monitor for fever and trend WBC  -No need to continue Abx
-Pt found to have positive RVP on admission for enterovirus  -S/p zosyn in ED  -Pt does not endorse any symptoms currently   -Pt afebrile, no leukocytosis   -Continue supportive measures and NS IV fluids  -Lactate 4.2 initially, now downtrending (1.6)   -Monitor for fever and trend WBC  -No need to continue Abx

## 2024-09-23 NOTE — PROGRESS NOTE ADULT - ATTENDING COMMENTS
68-year-old female with a PMHx of hyperthyroidism on methimazole, hypertension, breast CA 2011 s/p lumpectomy, chemo, radiation, and HLD presents to the ED BIBEMS with weakness admitted for hypotension, new onset afib, and supportive care for Entero/Rhinovirus.    Agree with above. Edited where appropriate
68-year-old female with a PMHx of hyperthyroidism on methimazole, hypertension, breast CA 2011 s/p lumpectomy, chemo, radiation, and HLD presents to the ED BIBEMS with weakness admitted for hypotension, new onset afib, and supportive care for Entero/Rhinovirus.    Agree with above. Edited where appropriate

## 2024-09-23 NOTE — PROGRESS NOTE ADULT - PROBLEM SELECTOR PLAN 7
-Pt has hx of hyperthyroidism on methimazole. Daughter notes that pt is also sometimes hypothyroid  -Pt seen by endocrinologist (does not recall name of ) and is being evaluated for possible surgery   -CT chest: Enlarged multinodular thyroid gland  -TSH 0.18  -F/u AM labs  -C/w home methimazole  - ? thyroid primary  -Heme/onc consulted (Dr. Garland); f/u recs  -Endocrine consulted Dr. Perlman, f/u recs
-Pt has hx of hyperthyroidism on methimazole. Daughter notes that pt is also sometimes hypothyroid  -Pt seen by endocrinologist (does not recall name of ) and is being evaluated for possible surgery   -CT chest: Enlarged multinodular thyroid gland  - recommended f/u o/p with ENT for endoscopic CT   -TSH 0.18  - free TSH 0.3  -F/u AM labs  -C/w home methimazole 15mg PO qd as per Dr. Perlman recs  - ? thyroid primary  -Heme/onc consulted (Dr. Garland); f/u recs  -Endocrine consulted Dr. Perlman, f/u recs

## 2024-09-23 NOTE — PROGRESS NOTE ADULT - SUBJECTIVE AND OBJECTIVE BOX
HPI:  Pt is a 68-year-old female with a PMHx of hyperthyroidism on methimazole, hypertension, breast CA 2011 s/p lumpectomy, chemo, radiation, and HLD presents to the ED BIBEMS with weakness. Daughter at bedside states that the pt came to the ED last Friday for severe back pain and was discharged home the same day. A CTA was performed which showed left breast peripheral rim-enhancing fluid attenuation density with overlying skin thickening. Also with enlarged thyroid levels which patient is aware about and there has been evaluation for possible surgery.There was also concern for bony metastasis on this scan. Then on Sunday patient continued to have intense back pain so daughter called her PCP and they went to her office. Pt was given multiple pain medications with relief of symptoms. Pt daughter states that today in the afternoon the pt said she felt unwell and was shaky, clammy and said she could not see well so they called EMS. Pt currently has no trouble seeing she just notes that she feels, "cold" and still has back pain. Daughter also adds that 2 weeks ago the patient was found to have an abnormal finding on mammogram so an MRI and breast biopsy were ordered.     Denies fever, chest pain, trouble breathing, vomiting, SOB, cough, abdominal pain, nausea, vomiting, diarrhea, constipation, urgency, or dysuria, headaches, changes in vision, dizziness, numbness, tingling.    Denies recent travel, recent antibiotic use, or sick contacts.    ED Course:   Vitals: BP: 55/35 --> 80/56 , HR: 69 RR:18 , Temp: 97.8F SpO2: 99% on RA  Labs:  Cr 1.5, Glucose 224, Lactate 4.2, TSH 0.18, +RVP (rhinovirus), Alk Phos 132, eGFR 38  UA: neg  CXR: IMPRESSION: No acute cardiopulmonary disease process.  CT Head: 1. No acute intracranial hemorrhage, mass effect, or midline shift. Bilateral optic globe proptosis  CT Chest/AP: Enlarged multinodular thyroid gland. Mild mediastinal lymphadenopathy.   Trace ascites. Possible periportal edema  EKG: Rate 52, interpretation slow A-fib    Received in the ED:  2L NS IV bolus, Zosyn IVPB 3.375g   (21 Sep 2024 21:49)      INTERVAL HPI/OVERNIGHT EVENTS: Patient was seen in bed with daughter at bedside, who offers additional interval history. Daughter stated that the patient felt restless overnight though eventually fell asleep, was feeling anxious while in the hospital and was reluctant to eat because of dietary requirements (halal). Othewise no overnight complaints. Patient denies CP, SOB, fever, chills. Patient is urinating well, has not had to move bowels yet. Patient has been in NSR.     MEDICATIONS  (STANDING):  apixaban 2.5 milliGRAM(s) Oral every 12 hours  atenolol  Tablet 25 milliGRAM(s) Oral daily  atorvastatin 10 milliGRAM(s) Oral at bedtime  lidocaine 2% Gel 1 Application(s) Topical daily  methimazole 15 milliGRAM(s) Oral daily  multivitamin 1 Tablet(s) Oral daily  pantoprazole   Suspension 40 milliGRAM(s) Oral daily  sodium chloride 0.9%. 1000 milliLiter(s) (70 mL/Hr) IV Continuous <Continuous>    MEDICATIONS  (PRN):  acetaminophen     Tablet .. 650 milliGRAM(s) Oral every 6 hours PRN Temp greater or equal to 38C (100.4F), Mild Pain (1 - 3)  LORazepam     Tablet 0.5 milliGRAM(s) Oral once PRN Anxiety  melatonin 5 milliGRAM(s) Oral at bedtime PRN Insomnia  traMADol 50 milliGRAM(s) Oral every 6 hours PRN Severe Pain (7 - 10)      Allergies    No Known Allergies    Intolerances        REVIEW OF SYSTEMS:  CONSTITUTIONAL: No fever or chills  HEENT:  No headache, no sore throat  RESPIRATORY: No cough, wheezing, or shortness of breath  CARDIOVASCULAR: No chest pain, palpitations  GASTROINTESTINAL: No abd pain, nausea, vomiting, or diarrhea  GENITOURINARY: No dysuria, frequency, or hematuria  NEUROLOGICAL: no focal weakness or dizziness  MUSCULOSKELETAL: no myalgias, endorses back pain    Vital Signs Last 24 Hrs  T(C): 36.9 (23 Sep 2024 05:11), Max: 36.9 (23 Sep 2024 05:11)  T(F): 98.4 (23 Sep 2024 05:11), Max: 98.4 (23 Sep 2024 05:11)  HR: 63 (23 Sep 2024 05:11) (63 - 67)  BP: 165/78 (23 Sep 2024 05:11) (140/68 - 165/78)  BP(mean): --  RR: 19 (22 Sep 2024 20:43) (19 - 19)  SpO2: 94% (22 Sep 2024 20:43) (94% - 94%)    Parameters below as of 22 Sep 2024 20:43  Patient On (Oxygen Delivery Method): room air        PHYSICAL EXAM:  GENERAL: NAD, seen asleep in bed  HEENT:  exophthalmos noted, anicteric, moist mucous membranes, no goiter appreciated   CHEST/LUNG:  CTA b/l, no rales, wheezes, or rhonchi  HEART:  RRR, S1, S2  ABDOMEN:  soft, nontender, nondistended  EXTREMITIES: no edema, cyanosis, or calf tenderness, no myxedema   NERVOUS SYSTEM: answers questions and follows commands appropriately    LABS:                        12.2   6.70  )-----------( 200      ( 23 Sep 2024 07:45 )             35.4     CBC Full  -  ( 23 Sep 2024 07:45 )  WBC Count : 6.70 K/uL  Hemoglobin : 12.2 g/dL  Hematocrit : 35.4 %  Platelet Count - Automated : 200 K/uL  Mean Cell Volume : 86.6 fl  Mean Cell Hemoglobin : 29.8 pg  Mean Cell Hemoglobin Concentration : 34.5 gm/dL  Auto Neutrophil # : 4.52 K/uL  Auto Lymphocyte # : 1.29 K/uL  Auto Monocyte # : 0.58 K/uL  Auto Eosinophil # : 0.27 K/uL  Auto Basophil # : 0.03 K/uL  Auto Neutrophil % : 67.5 %  Auto Lymphocyte % : 19.3 %  Auto Monocyte % : 8.7 %  Auto Eosinophil % : 4.0 %  Auto Basophil % : 0.4 %    23 Sep 2024 07:45    142    |  114    |  9      ----------------------------<  92     3.7     |  23     |  0.90     Ca    9.4        23 Sep 2024 07:45    TPro  7.4    /  Alb  3.0    /  TBili  0.8    /  DBili  x      /  AST  28     /  ALT  34     /  AlkPhos  126    23 Sep 2024 07:45    PT/INR - ( 21 Sep 2024 16:05 )   PT: 11.6 sec;   INR: 1.02 ratio         PTT - ( 21 Sep 2024 16:05 )  PTT:37.3 sec  Urinalysis Basic - ( 23 Sep 2024 07:45 )    Color: x / Appearance: x / SG: x / pH: x  Gluc: 92 mg/dL / Ketone: x  / Bili: x / Urobili: x   Blood: x / Protein: x / Nitrite: x   Leuk Esterase: x / RBC: x / WBC x   Sq Epi: x / Non Sq Epi: x / Bacteria: x      CAPILLARY BLOOD GLUCOSE            Culture - Blood (collected 09-21-24 @ 18:50)  Source: .Blood Blood-Peripheral  Preliminary Report (09-23-24 @ 02:01):    No growth at 24 hours    Culture - Blood (collected 09-21-24 @ 18:40)  Source: .Blood Blood-Peripheral  Preliminary Report (09-23-24 @ 02:01):    No growth at 24 hours    Urinalysis with Rflx Culture (collected 09-21-24 @ 18:15)        RADIOLOGY & ADDITIONAL TESTS:  < from: CT Abdomen and Pelvis No Cont (09.21.24 @ 18:10) >  FINDINGS:  CHEST:  LUNGS AND LARGE AIRWAYS: Apparent narrowing of cervical tracheal likely   due to expiratory phase and compression by enlarged thyroid gland. A   couple of subcentimeter calcified granulomas. No pulmonary consolidation   or suspicious nodules.  PLEURA: No pleural effusion.  VESSELS: No thoracic aortic aneurysm.  HEART: Heart size is normal. No pericardial effusion.  MEDIASTINUM AND BAILEY: Mildly enlarged right paratracheal lymph nodes.  CHEST WALL AND LOWER NECK: Markedly enlarged multinodular thyroid again   noted. 4.8 cm fluid attenuation lesion in the left breast, probably   postoperative seroma. Skin thickening in the left breast.    ABDOMEN AND PELVIS:  LIVER: Suspected periportal edema. No discrete mass..  BILE DUCTS: Normalcaliber.  GALLBLADDER: Cholecystectomy.  SPLEEN: Within normal limits.  PANCREAS: A few small pancreatic calcifications, otherwise unremarkable.  ADRENALS: No discrete adrenal nodules.  KIDNEYS/URETERS: No hydronephrosis or nephrolithiasis.    BLADDER: Within normal limits.  REPRODUCTIVE ORGANS: Uterus and adnexa within normal limits.    BOWEL: No bowel obstruction. Appendix is not visualized.  PERITONEUM/RETROPERITONEUM: Trace perihepatic ascites..  VESSELS: No abdominal aortic aneurysm.  LYMPH NODES: No lymphadenopathy.  ABDOMINAL WALL: Within normal limits.  BONES: Scoliosis and degenerative changes.        IMPRESSION:    Enlarged multinodular thyroid gland. Mild mediastinal lymphadenopathy.   Trace ascites. Possible periportal edema. No other significant acute   abnormality is identified in this limited study without contrast.    < end of copied text >      Consultant(s) Notes Reviewed:  [x] YES  [ ] NO HPI:  Pt is a 68-year-old female with a PMHx of hyperthyroidism on methimazole, hypertension, breast CA 2011 s/p lumpectomy, chemo, radiation, and HLD presents to the ED BIBEMS with weakness. Daughter at bedside states that the pt came to the ED last Friday for severe back pain and was discharged home the same day. A CTA was performed which showed left breast peripheral rim-enhancing fluid attenuation density with overlying skin thickening. Also with enlarged thyroid levels which patient is aware about and there has been evaluation for possible surgery.There was also concern for bony metastasis on this scan. Then on Sunday patient continued to have intense back pain so daughter called her PCP and they went to her office. Pt was given multiple pain medications with relief of symptoms. Pt daughter states that today in the afternoon the pt said she felt unwell and was shaky, clammy and said she could not see well so they called EMS. Pt currently has no trouble seeing she just notes that she feels, "cold" and still has back pain. Daughter also adds that 2 weeks ago the patient was found to have an abnormal finding on mammogram so an MRI and breast biopsy were ordered.     Denies fever, chest pain, trouble breathing, vomiting, SOB, cough, abdominal pain, nausea, vomiting, diarrhea, constipation, urgency, or dysuria, headaches, changes in vision, dizziness, numbness, tingling.    Denies recent travel, recent antibiotic use, or sick contacts.    ED Course:   Vitals: BP: 55/35 --> 80/56 , HR: 69 RR:18 , Temp: 97.8F SpO2: 99% on RA  Labs:  Cr 1.5, Glucose 224, Lactate 4.2, TSH 0.18, +RVP (rhinovirus), Alk Phos 132, eGFR 38  UA: neg  CXR: IMPRESSION: No acute cardiopulmonary disease process.  CT Head: 1. No acute intracranial hemorrhage, mass effect, or midline shift. Bilateral optic globe proptosis  CT Chest/AP: Enlarged multinodular thyroid gland. Mild mediastinal lymphadenopathy.   Trace ascites. Possible periportal edema  EKG: Rate 52, interpretation slow A-fib    Received in the ED:  2L NS IV bolus, Zosyn IVPB 3.375g   (21 Sep 2024 21:49)      INTERVAL HPI/OVERNIGHT EVENTS: Patient was seen in bed with daughter at bedside, who offers additional interval history. Daughter stated that the patient felt restless overnight though eventually fell asleep, was feeling anxious while in the hospital and was reluctant to eat because of dietary requirements (halal). Othewise no overnight complaints. Patient denies CP, SOB, fever, chills. Patient is urinating well, has not had to move bowels yet. Patient has been in NSR.     MEDICATIONS  (STANDING):  apixaban 2.5 milliGRAM(s) Oral every 12 hours  atenolol  Tablet 25 milliGRAM(s) Oral daily  atorvastatin 10 milliGRAM(s) Oral at bedtime  lidocaine 2% Gel 1 Application(s) Topical daily  methimazole 15 milliGRAM(s) Oral daily  multivitamin 1 Tablet(s) Oral daily  pantoprazole   Suspension 40 milliGRAM(s) Oral daily  sodium chloride 0.9%. 1000 milliLiter(s) (70 mL/Hr) IV Continuous <Continuous>    MEDICATIONS  (PRN):  acetaminophen     Tablet .. 650 milliGRAM(s) Oral every 6 hours PRN Temp greater or equal to 38C (100.4F), Mild Pain (1 - 3)  LORazepam     Tablet 0.5 milliGRAM(s) Oral once PRN Anxiety  melatonin 5 milliGRAM(s) Oral at bedtime PRN Insomnia  traMADol 50 milliGRAM(s) Oral every 6 hours PRN Severe Pain (7 - 10)      Allergies    No Known Allergies    Intolerances        REVIEW OF SYSTEMS:  CONSTITUTIONAL: No fever or chills  HEENT:  No headache, no sore throat  RESPIRATORY: No cough, wheezing, or shortness of breath  CARDIOVASCULAR: No chest pain, palpitations  GASTROINTESTINAL: No abd pain, nausea, vomiting, or diarrhea  GENITOURINARY: No dysuria, frequency, or hematuria  NEUROLOGICAL: no focal weakness or dizziness  MUSCULOSKELETAL: no myalgias, endorses back pain    Vital Signs Last 24 Hrs  T(C): 36.9 (23 Sep 2024 05:11), Max: 36.9 (23 Sep 2024 05:11)  T(F): 98.4 (23 Sep 2024 05:11), Max: 98.4 (23 Sep 2024 05:11)  HR: 63 (23 Sep 2024 05:11) (63 - 67)  BP: 165/78 (23 Sep 2024 05:11) (140/68 - 165/78)  BP(mean): --  RR: 19 (22 Sep 2024 20:43) (19 - 19)  SpO2: 94% (22 Sep 2024 20:43) (94% - 94%)    Parameters below as of 22 Sep 2024 20:43  Patient On (Oxygen Delivery Method): room air        PHYSICAL EXAM:  GENERAL: NAD  HEENT:  exophthalmos noted, anicteric, moist mucous membranes, no goiter appreciated   CHEST/LUNG:  CTA b/l, no rales, wheezes, or rhonchi  HEART:  RRR, S1, S2  ABDOMEN:  soft, nontender, nondistended  EXTREMITIES: no edema, cyanosis, or calf tenderness, no myxedema   NERVOUS SYSTEM: answers questions and follows commands appropriately    LABS:                        12.2   6.70  )-----------( 200      ( 23 Sep 2024 07:45 )             35.4     CBC Full  -  ( 23 Sep 2024 07:45 )  WBC Count : 6.70 K/uL  Hemoglobin : 12.2 g/dL  Hematocrit : 35.4 %  Platelet Count - Automated : 200 K/uL  Mean Cell Volume : 86.6 fl  Mean Cell Hemoglobin : 29.8 pg  Mean Cell Hemoglobin Concentration : 34.5 gm/dL  Auto Neutrophil # : 4.52 K/uL  Auto Lymphocyte # : 1.29 K/uL  Auto Monocyte # : 0.58 K/uL  Auto Eosinophil # : 0.27 K/uL  Auto Basophil # : 0.03 K/uL  Auto Neutrophil % : 67.5 %  Auto Lymphocyte % : 19.3 %  Auto Monocyte % : 8.7 %  Auto Eosinophil % : 4.0 %  Auto Basophil % : 0.4 %    23 Sep 2024 07:45    142    |  114    |  9      ----------------------------<  92     3.7     |  23     |  0.90     Ca    9.4        23 Sep 2024 07:45    TPro  7.4    /  Alb  3.0    /  TBili  0.8    /  DBili  x      /  AST  28     /  ALT  34     /  AlkPhos  126    23 Sep 2024 07:45    PT/INR - ( 21 Sep 2024 16:05 )   PT: 11.6 sec;   INR: 1.02 ratio         PTT - ( 21 Sep 2024 16:05 )  PTT:37.3 sec  Urinalysis Basic - ( 23 Sep 2024 07:45 )    Color: x / Appearance: x / SG: x / pH: x  Gluc: 92 mg/dL / Ketone: x  / Bili: x / Urobili: x   Blood: x / Protein: x / Nitrite: x   Leuk Esterase: x / RBC: x / WBC x   Sq Epi: x / Non Sq Epi: x / Bacteria: x      CAPILLARY BLOOD GLUCOSE            Culture - Blood (collected 09-21-24 @ 18:50)  Source: .Blood Blood-Peripheral  Preliminary Report (09-23-24 @ 02:01):    No growth at 24 hours    Culture - Blood (collected 09-21-24 @ 18:40)  Source: .Blood Blood-Peripheral  Preliminary Report (09-23-24 @ 02:01):    No growth at 24 hours    Urinalysis with Rflx Culture (collected 09-21-24 @ 18:15)        RADIOLOGY & ADDITIONAL TESTS:  < from: CT Abdomen and Pelvis No Cont (09.21.24 @ 18:10) >  FINDINGS:  CHEST:  LUNGS AND LARGE AIRWAYS: Apparent narrowing of cervical tracheal likely   due to expiratory phase and compression by enlarged thyroid gland. A   couple of subcentimeter calcified granulomas. No pulmonary consolidation   or suspicious nodules.  PLEURA: No pleural effusion.  VESSELS: No thoracic aortic aneurysm.  HEART: Heart size is normal. No pericardial effusion.  MEDIASTINUM AND BAILEY: Mildly enlarged right paratracheal lymph nodes.  CHEST WALL AND LOWER NECK: Markedly enlarged multinodular thyroid again   noted. 4.8 cm fluid attenuation lesion in the left breast, probably   postoperative seroma. Skin thickening in the left breast.    ABDOMEN AND PELVIS:  LIVER: Suspected periportal edema. No discrete mass..  BILE DUCTS: Normalcaliber.  GALLBLADDER: Cholecystectomy.  SPLEEN: Within normal limits.  PANCREAS: A few small pancreatic calcifications, otherwise unremarkable.  ADRENALS: No discrete adrenal nodules.  KIDNEYS/URETERS: No hydronephrosis or nephrolithiasis.    BLADDER: Within normal limits.  REPRODUCTIVE ORGANS: Uterus and adnexa within normal limits.    BOWEL: No bowel obstruction. Appendix is not visualized.  PERITONEUM/RETROPERITONEUM: Trace perihepatic ascites..  VESSELS: No abdominal aortic aneurysm.  LYMPH NODES: No lymphadenopathy.  ABDOMINAL WALL: Within normal limits.  BONES: Scoliosis and degenerative changes.        IMPRESSION:    Enlarged multinodular thyroid gland. Mild mediastinal lymphadenopathy.   Trace ascites. Possible periportal edema. No other significant acute   abnormality is identified in this limited study without contrast.    < end of copied text >      Consultant(s) Notes Reviewed:  [x] YES  [ ] NO

## 2024-09-23 NOTE — PROGRESS NOTE ADULT - ASSESSMENT
68-year-old female with a PMHx of hyperthyroidism on methimazole, hypertension, breast CA 2011 s/p lumpectomy, chemo, radiation, and HLD presents to the ED BIBEMS with weakness admitted for hypotension, new onset afib, and supportive care for Entero/Rhinovirus.     - Pt found to have new onset A-fib in ER, was in A fib in ER, but converted to SR 9/21 by the time she came to tele.   - Likely in the setting of hyperthyroidism and viral infection   - EKG Afib @ 52  - EKG 9/22 am with SR @ 85  - Tele w/ SR 60-80s, nonsustained 100s x 1   - Not on AV sanna agents   - She is likely to have recurrences given her hyperthyroid state, and that based on age, htn and malignancy her risk of cardioembolic stroke seems elevated  - Would favor starting ac, and would need to prove that this is a one time issue before considering her to be sufficiently low risk to stop ac     - BP low on admission to 55/35, S/p IVF  - BP now improved  - BP stable and controlled with -160s  - Can gradually resume home antihypertensives   - On atenolol, losartan and nifedipine at home    - EKG w/ A fib @ 52  - EKG 9/22 am with SR @ 85  - Troponin: <-11.4  - No evidence of any active ischemia   - Continue statin     - Pt found to have positive RVP on admission for enterovirus  - Continue supportive measures and NS IV fluids    - Pt has hx of breast cancer, was scheduled for out pt MRI and breast biopsy  - Heme/onc following to r/o malignancy   - Management of hyperthyroidism per primary. Endocrine following     - Monitor and replete lytes, keep K>4, Mg>2.  - Will continue to follow.    Lorri Morales, MS FNP, AGACNP  Nurse Practitioner- Cardiology   Please call on TEAMS   68-year-old female with a PMHx of hyperthyroidism on methimazole, hypertension, breast CA 2011 s/p lumpectomy, chemo, radiation, and HLD presents to the ED BIBEMS with weakness admitted for hypotension, new onset afib, and supportive care for Entero/Rhinovirus.     - Pt found to have new onset A-fib in ER, was in A fib in ER, but converted to SR 9/21 by the time she came to tele.   - Likely in the setting of hyperthyroidism and viral infection   - EKG Afib @ 52  - EKG 9/22 am with SR @ 85  - Tele w/ SR 60-80s, nonsustained 100s x 1   - Not on AV sanna agents   - She is likely to have recurrences given her hyperthyroid state, and that based on age, htn and malignancy her risk of cardioembolic stroke seems elevated  - Would favor starting ac, and would need to prove that this is a one time issue before considering her to be sufficiently low risk to stop ac     - BP low on admission to 55/35, S/p IVF  - BP now improved  - BP stable and controlled with -160s  - resume atenolol 25mg Qday    - EKG w/ A fib @ 52  - EKG 9/22 am with SR @ 85  - Troponin: <-11.4  - No evidence of any active ischemia   - Continue statin     - Pt found to have positive RVP on admission for enterovirus  - Continue supportive measures and NS IV fluids    - Pt has hx of breast cancer, was scheduled for out pt MRI and breast biopsy  - Heme/onc following to r/o malignancy   - Management of hyperthyroidism per primary. Endocrine following     - Monitor and replete lytes, keep K>4, Mg>2.  - Will continue to follow.    Lorri Morales, MS FNP, AGACNP  Nurse Practitioner- Cardiology   Please call on TEAMS

## 2024-09-23 NOTE — CARE COORDINATION ASSESSMENT. - NSCAREPROVIDERS_GEN_ALL_CORE_FT
CARE PROVIDERS:  Accepting Physician: Kyle Hughes  Administration: Ricci Camara  Administration: Severino Olguin  Admitting: Kyle Hughes  Attending: Kiki Dunbar  Consultant: Hon Wei  Consultant: Lorri Moarles  Consultant: Sylvie Acosta  Consultant: Daniel Limon  Consultant: Perlman, Craig  Consultant: Hedy Sommer  Covering Team: Joyce Viera  ED ACP: María Chen  ED Attending: Darren Johnson  ED Nurse: Jessica Duong  Nurse: Nely Kemp  Nurse: Haydee Rey  Override: Missy Oneil  PCA/Nursing Assistant: Conchis De Santiago  PCA/Nursing Assistant: Kay Sousa  Physical Therapy: Brady Nash  Primary Team: Bill Gonzalez  Primary Team: Estuardo Crabtree  Primary Team: Destiny Moreno  Primary Team: Kyle Hughes  Primary Team: Kiki Dunbar  Primary Team: Jann Castillo  Radiology Technician: Roderick Valdivia  Registered Dietitian: Mignon Magana  : Lena Pinto  Team: [facility], Team   CARE PROVIDERS:  Accepting Physician: Kyle Hughes  Administration: Ricci Camara  Administration: Severino Olguin  Admitting: Kyle Hughes  Attending: Kiki Dunbar  Consultant: Hon Wei  Consultant: Lorri Morales  Consultant: Sylvie Acosta  Consultant: Daniel Limon  Consultant: Perlman, Craig  Consultant: Hedy Sommer  Covering Team: Joyce Viera  ED ACP: María Chen  ED Attending: Darren Johnson  ED Nurse: Jessica Duong  Nurse: Nely Kemp  Nurse: Haydee Rey  Override: Missy Oneil  PCA/Nursing Assistant: Kay Sousa  Physical Therapy: Brady Nash  Primary Team: Bill Gonzalez  Primary Team: Estuardo Crabtree  Primary Team: Kyle Hughes  Primary Team: Destiny Moreno  Primary Team: Kiki Dunbar  Primary Team: Jann Castillo  Radiology Technician: Roderick Valdivia  Registered Dietitian: Mignon Magana  : Lena Pinto  Team: [facility], Team

## 2024-09-23 NOTE — PROGRESS NOTE ADULT - TIME BILLING
direct patient care including but not limited to reviewing chart, medications ,laboratory data, imaging reports, discussion of plan of care with consultants on the case, coordination of care with multidisciplinary team involved in the case and discussion of plan with patient and daughter who are agreeable to plan of care and verbalized understanding the anticipated hospital course and treatment plan.      Time spent excludes teaching and separately reported services.
direct patient care including but not limited to reviewing chart, medications ,laboratory data, imaging reports, discussion of plan of care with consultants on the case, coordination of care with multidisciplinary team involved in the case and discussion of plan with patient and daughter who are agreeable to plan of care and verbalized understanding the anticipated hospital course and treatment plan.      Time spent excludes teaching and separately reported services.

## 2024-09-23 NOTE — PROGRESS NOTE ADULT - PROBLEM SELECTOR PLAN 8
-Pt has hx of high chol  -On home simvastatin, continue   -F/u lipids AM
-Pt has hx of high chol  -On home simvastatin, continue   -F/u lipids AM

## 2024-09-23 NOTE — PROGRESS NOTE ADULT - ASSESSMENT
67 yo woman w hyper/hypothyroid on methimazole known of enlarged thyroid for years and has delayed surgery(now planned and has surgery followup), Stage IA pT1bN0  left breast ca 2017 post lumpectomy [facility] (6mm w additional DCIS, ERPR positive, Kat1Ots negative) post adj RT and 5yrs of hormne prophylaxis(could not recall name)  Pt has been taking care of ill adriánad who recently  from cancer and neglicting own care  Recently had mammo as outpatient(new Radiology facility, has not gone for mammo since COVID pandemic), and found left breast abnormality for MRI and bx next Tuesday.  Incr mid back pain x 2 wks, attributed to lifting and moving ill . seen last  in ER for sever back pain(), then PCP on , given 2 new meds, pain subsided on Monday but w the medicaton incr weakness, unsteadiness, and now adm to [facility]  w/u-  24 CTQ chest- no PE, marked enlarged thyroid w low attenuation and calcif. Left breast 5x4.2cm carter rim-enhancing fluid attenuation density, left breast skini thickening. Heterogenous vertebra recommend Bone Scan.  24 CT head no acute findings  24 CT c/a/p again seen the marked enlarged mutinodular thyroid, left breast 4.8cm fluid atten lesion felt could be post op seroma. Bone w scoliosis/degen changes. Liver periportal edema, Pancreas sm calcif  Labs mild incr alk phos and second lab min incr SGOT  Positive for enterovirus/rhinovirus    -left breast finding may be due to post surgical and RT changes of skin and seroma  -the 2013 left breast ca was very early stage, unlikely to be met. Although pt has not had mammo since COVID  until recently so new breast malignancy not related to CT findings still possible(pt has next Tues outpatinet MRi and bx planned)  -back pain may be due to muscle strain due to lifting , but agree should r/o mets clemencia w sl incr of alk phos and heterogen vertebra finding on 24 CTA chest  -also possible primary thyroid ca (+/- bone mets) in view of thyroid status    RECOMMEND    s/p NM BOne scan. Await results  planned MRI T spine today. Await results    Follow-up  CA 27.29    Depending on imaging findings, consider Ortho eval  discussed w pt and daughter    To follow in office with Dr Acosta post DC    thank you, will follow w you

## 2024-09-23 NOTE — CONSULT NOTE ADULT - SUBJECTIVE AND OBJECTIVE BOX
Patient is a 68y old  Female who presents with a chief complaint of hypotension (22 Sep 2024 12:25)      Reason For Consult: hyperthyroidism, multinodular thyroid goiter    HPI:  Pt is a 68-year-old female with a PMHx of hyperthyroidism on methimazole, hypertension, breast CA 2011 s/p lumpectomy, chemo, radiation, and HLD presents to the ED BIBEMS with weakness. Daughter at bedside states that the pt came to the ED last Friday for severe back pain and was discharged home the same day. A CTA was performed which showed left breast peripheral rim-enhancing fluid attenuation density with overlying skin thickening. Also with enlarged thyroid levels which patient is aware about and there has been evaluation for possible surgery.There was also concern for bony metastasis on this scan. Then on Sunday patient continued to have intense back pain so daughter called her PCP and they went to her office. Pt was given multiple pain medications with relief of symptoms. Pt daughter states that today in the afternoon the pt said she felt unwell and was shaky, clammy and said she could not see well so they called EMS. Pt currently has no trouble seeing she just notes that she feels, "cold" and still has back pain. Daughter also adds that 2 weeks ago the patient was found to have an abnormal finding on mammogram so an MRI and breast biopsy were ordered.      Denies fever, chest pain, trouble breathing, vomiting, SOB, cough, abdominal pain, nausea, vomiting, diarrhea, constipation, urgency, or dysuria, headaches, changes in vision, dizziness, numbness, tingling.    Denies recent travel, recent antibiotic use, or sick contacts.    ED Course:   Vitals: BP: 55/35 --> 80/56 , HR: 69 RR:18 , Temp: 97.8F SpO2: 99% on RA  Labs:  Cr 1.5, Glucose 224, Lactate 4.2, TSH 0.18, +RVP (rhinovirus), Alk Phos 132, eGFR 38  UA: neg  CXR: IMPRESSION: No acute cardiopulmonary disease process.  CT Head: 1. No acute intracranial hemorrhage, mass effect, or midline shift. Bilateral optic globe proptosis  CT Chest/AP: Enlarged multinodular thyroid gland. Mild mediastinal lymphadenopathy.   Trace ascites. Possible periportal edema  EKG: Rate 52, interpretation slow A-fib    Received in the ED:  2L NS IV bolus, Zosyn IVPB 3.375g   (21 Sep 2024 21:49)      PAST MEDICAL & SURGICAL HISTORY:  Hyperthyroidism      High cholesterol      Hypertension      History of cholecystectomy      H/O lumpectomy          FAMILY HISTORY:  No pertinent family history in first degree relatives          Social History:    MEDICATIONS  (STANDING):  atorvastatin 10 milliGRAM(s) Oral at bedtime  heparin   Injectable 5000 Unit(s) SubCutaneous every 12 hours  lidocaine 2% Gel 1 Application(s) Topical daily  methimazole 10 milliGRAM(s) Oral <User Schedule>  multivitamin 1 Tablet(s) Oral daily  pantoprazole   Suspension 40 milliGRAM(s) Oral daily  sodium chloride 0.9%. 1000 milliLiter(s) (70 mL/Hr) IV Continuous <Continuous>    MEDICATIONS  (PRN):  acetaminophen     Tablet .. 650 milliGRAM(s) Oral every 6 hours PRN Temp greater or equal to 38C (100.4F), Mild Pain (1 - 3)  melatonin 5 milliGRAM(s) Oral at bedtime PRN Insomnia  traMADol 50 milliGRAM(s) Oral every 6 hours PRN Severe Pain (7 - 10)        T(C): 36.9 (09-23-24 @ 05:11), Max: 37.3 (09-22-24 @ 12:42)  HR: 63 (09-23-24 @ 05:11) (63 - 67)  BP: 165/78 (09-23-24 @ 05:11) (140/68 - 165/78)  RR: 19 (09-22-24 @ 20:43) (18 - 19)  SpO2: 94% (09-22-24 @ 20:43) (93% - 94%)  Wt(kg): --    PHYSICAL EXAM:  HEAD:  b/l exophthalmos  CHEST/LUNG: Clear to percussion bilaterally; No rales, rhonchi, wheezing, or rubs  HEART: Regular rate and rhythm; No murmurs, rubs, or gallops  ABDOMEN: Soft, Nontender, Nondistended; Bowel sounds present  EXTREMITIES:  2+ Peripheral Pulses, No clubbing, cyanosis, or edema  SKIN: No rashes or lesions    CAPILLARY BLOOD GLUCOSE                                12.2   6.70  )-----------( 200      ( 23 Sep 2024 07:45 )             35.4       CMP:  09-23 @ 07:45  SGPT 34  Albumin 3.0   Alk Phos 126   Anion Gap 5   SGOT 28   Total Bili 0.8   BUN 9   Calcium Total 9.4   CO2 23   Chloride 114   Creatinine 0.90   eGFR if AA --   eGFR if non AA --   Glucose 92   Potassium 3.7   Protein 7.4   Sodium 142      Thyroid Function Tests:  09-22 @ 09:40 TSH -- FreeT4 0.3 T3 -- Anti TPO -- Anti Thyroglobulin Ab -- TSI --  09-21 @ 16:05 TSH 0.18 FreeT4 -- T3 172 Anti TPO -- Anti Thyroglobulin Ab -- TSI --      Diabetes Tests:       Radiology:

## 2024-09-23 NOTE — PROGRESS NOTE ADULT - PROBLEM SELECTOR PLAN 2
Increased confusion per daughter. Possibly in setting of acute URI vs. metastatic    - f/u MR head  - Neuro to be consulted tomorrow; awaiting recs
Increased confusion per daughter. Possibly in setting of acute URI vs. metastatic    - f/u MR head  - Neuro to be consulted tomorrow; awaiting recs

## 2024-09-23 NOTE — PROGRESS NOTE ADULT - PROBLEM SELECTOR PLAN 3
Pt came to ED 1 week ago for severe back pain and was sent home. Back pain continued so went to PCP  -Pt prescribed tramadol, Flexeril, Tylenol and Naproxen  -Pt has hx of breast cancer and previous CTA from 1 week ago is concerning for possible bony metastasis   -Back pain may be 2/2 mets vs MSK related  -Pt was scheduled for outpt MRI and breast biopsy  -C/w tylenol, tramadol and lidocaine patch for pain  - f/u bone scan  - f/u MR thoracic  - f/u CA 27.29  - Heme/onc consulted Dr. Acosta, f/u recs

## 2024-09-23 NOTE — PROGRESS NOTE ADULT - SUBJECTIVE AND OBJECTIVE BOX
[facility] -- Braxton Devries, Kemal Nguyễn Savella, Glen Briseno: Office # [phone]    Follow Up:  Hypotension, A fib, new onset    Subjective/Observations: Patient awake, alert, resting in bed. Denies chest pain, palpitations and dizziness. Denies any difficulty breathing. No orthopnea and PND. Tolerating room air.     REVIEW OF SYSTEMS: All other review of systems are negative unless indicated above    PAST MEDICAL & SURGICAL HISTORY:  Hyperthyroidism      High cholesterol      Hypertension      Hypertension      History of cholecystectomy      H/O lumpectomy    MEDICATIONS  (STANDING):  atorvastatin 10 milliGRAM(s) Oral at bedtime  heparin   Injectable 5000 Unit(s) SubCutaneous every 12 hours  lidocaine 2% Gel 1 Application(s) Topical daily  methimazole 10 milliGRAM(s) Oral <User Schedule>  multivitamin 1 Tablet(s) Oral daily  pantoprazole   Suspension 40 milliGRAM(s) Oral daily  sodium chloride 0.9%. 1000 milliLiter(s) (70 mL/Hr) IV Continuous <Continuous>    MEDICATIONS  (PRN):  acetaminophen     Tablet .. 650 milliGRAM(s) Oral every 6 hours PRN Temp greater or equal to 38C (100.4F), Mild Pain (1 - 3)  melatonin 5 milliGRAM(s) Oral at bedtime PRN Insomnia  traMADol 50 milliGRAM(s) Oral every 6 hours PRN Severe Pain (7 - 10)    Allergies    No Known Allergies    Intolerances      Vital Signs Last 24 Hrs  T(C): 36.9 (23 Sep 2024 05:11), Max: 37.3 (22 Sep 2024 12:42)  T(F): 98.4 (23 Sep 2024 05:11), Max: 99.1 (22 Sep 2024 12:42)  HR: 63 (23 Sep 2024 05:11) (63 - 67)  BP: 165/78 (23 Sep 2024 05:11) (140/68 - 165/78)  BP(mean): --  RR: 19 (22 Sep 2024 20:43) (18 - 19)  SpO2: 94% (22 Sep 2024 20:43) (93% - 94%)    Parameters below as of 22 Sep 2024 20:43  Patient On (Oxygen Delivery Method): room air      I&O's Summary        TELE: SR 50-60s nonsustained 100s   PHYSICAL EXAM:  Constitutional: NAD, awake and alert,   HEENT: Moist Mucous Membranes, Anicteric  Pulmonary: Non-labored, breath sounds are clear bilaterally, No wheezing, rales or rhonchi  Cardiovascular: Regular, S1 and S2, No murmurs, No rubs, gallops or clicks  Gastrointestinal: Bowel Sounds present, soft, nontender.   Lymph: No peripheral edema. No lymphadenopathy.  Skin: No visible rashes or ulcers.  Psych:  Mood & affect appropriate      LABS: All Labs Reviewed:                        12.2   6.70  )-----------( 200      ( 23 Sep 2024 07:45 )             35.4                         13.6   8.57  )-----------( 263      ( 22 Sep 2024 06:05 )             39.6                         12.5   8.05  )-----------( 293      ( 21 Sep 2024 16:05 )             38.1     23 Sep 2024 07:45    142    |  114    |  9      ----------------------------<  92     3.7     |  23     |  0.90   22 Sep 2024 06:05    144    |  113    |  11     ----------------------------<  93     3.5     |  24     |  1.10   21 Sep 2024 16:05    136    |  108    |  16     ----------------------------<  224    4.1     |  19     |  1.50     Ca    9.4        23 Sep 2024 07:45  Ca    9.5        22 Sep 2024 06:05  Ca    9.4        21 Sep 2024 16:05    TPro  7.4    /  Alb  3.0    /  TBili  0.8    /  DBili  x      /  AST  28     /  ALT  34     /  AlkPhos  126    23 Sep 2024 07:45  TPro  8.4    /  Alb  3.5    /  TBili  0.8    /  DBili  x      /  AST  38     /  ALT  48     /  AlkPhos  147    22 Sep 2024 06:05  TPro  7.9    /  Alb  3.2    /  TBili  0.5    /  DBili  x      /  AST  25     /  ALT  24     /  AlkPhos  132    21 Sep 2024 16:05   LIVER FUNCTIONS - ( 23 Sep 2024 07:45 )  Alb: 3.0 g/dL / Pro: 7.4 g/dL / ALK PHOS: 126 U/L / ALT: 34 U/L / AST: 28 U/L / GGT: x           PT/INR - ( 21 Sep 2024 16:05 )   PT: 11.6 sec;   INR: 1.02 ratio      PTT - ( 21 Sep 2024 16:05 )  PTT:37.3 secTroponin I, High Sensitivity Result: 11.4 ng/L (09-21-24 @ 16:05)  Troponin I, High Sensitivity Result: 10.8 ng/L (09-14-24 @ 01:40)  Triiodothyronine, Total (T3 Total): 172 ng/dL (09-21-24 @ 16:05)  Lactate, Blood: 1.6 mmol/L (09-21-24 @ 21:05)  Lactate, Blood: 4.2 mmol/L (09-21-24 @ 16:05)    12 Lead ECG:   Ventricular Rate 85 BPM    Atrial Rate 85 BPM    P-R Interval 144 ms    QRS Duration 88 ms    Q-T Interval 356 ms    QTC Calculation(Bazett) 423 ms    P Axis 45 degrees    R Axis 13 degrees    T Axis 10 degrees    Diagnosis Line Normal sinus rhythm  Normal ECG  No previous ECGs available  Confirmed by Daniel Limon MD ([id number]) on 9/22/2024 11:51:21 AM (09-22-24 @ 01:19)

## 2024-09-23 NOTE — CARE COORDINATION ASSESSMENT. - OTHER PERTINENT DISCHARGE PLANNING INFORMATION:
Patient is Albanian speaking and defers assessment to her daughter. CM spoke with patient's daughter, Lien, introduced self and explained role of CM and transitional care planning. She verbalized understanding. Patient lives in private home with 4 MAYA, with family. Independent PTA with use of RW and cane when outdoors. Denies any active [facility] services. Receives 35 hours CDPAP services via [facility] (), through [facility] (ph: [phone]). When DC ready, CM should call [employer] to speak with SW to have services resumed, and fax DC summary to . Pending PT consult. Anticipated DC plan pending clinical course. CM will follow.

## 2024-09-23 NOTE — CARE COORDINATION ASSESSMENT. - NSDCPLANSERVICES_GEN_ALL_CORE
Anticipated Needs Unclear at Present [facility]; daughter would like to think about it. Patient pending PT consult./Anticipated Needs Unclear at Present/Home Care

## 2024-09-23 NOTE — PROGRESS NOTE ADULT - PROBLEM SELECTOR PLAN 9
-Heparin subQ 5000U q12  - Diet: DASH, halal  - Activity: ambulate with assistance - Diet: DASH, halal  - Activity: ambulate with assistance

## 2024-09-23 NOTE — PROGRESS NOTE ADULT - PROBLEM SELECTOR PLAN 4
-Pt found to have new onset A-fib per ED  -No AC for now despite CHADVASC score 3--> can be in setting of critical illness (low BP, viral infection)  -EKG: Ventricular rate 52. Afib  -Continue to monitor symptoms  -awaiting recs regarding AC requirement; will start after work-up from oncological standpoint in case of need for biopsy  -Cardiology consulted, f/u recs
-Pt found to have new onset A-fib per ED  -No AC for now despite CHADVASC score 3--> can be in setting of critical illness (low BP, viral infection)  -EKG: Ventricular rate 52. Afib  -Continue to monitor symptoms  -awaiting recs regarding AC requirement; will start after work-up from oncological standpoint in case of need for biopsy  -Cardiology consulted, f/u recs: would recommend starting on AC  - c/w eliquis 2.5mg PO q12

## 2024-09-23 NOTE — PROGRESS NOTE ADULT - PROBLEM SELECTOR PLAN 6
-Pt has hx of HTN  -On atenolol, losartan and nifedipine at home  - c/w atenolol 25mg PO qd  -Hold all other home BP meds  -Monitor hemodynamics

## 2024-09-23 NOTE — CONSULT NOTE ADULT - PROBLEM SELECTOR RECOMMENDATION 9
in presence of enlarged multinodular thyroid gland  cont methimazole  thyroid function tests c/w concomitant non-thyroidal illness   cont methimazole 15mg daily (current outpt dosage)  referral to Dr. Vinnie Booth, endocrine surgeon (tracheal compression on CT imaging; ~6mm  pt has outpt endocrine f/u as well

## 2024-09-23 NOTE — CARE COORDINATION ASSESSMENT. - NSPASTMEDSURGHISTORY_GEN_ALL_CORE_FT
PAST MEDICAL & SURGICAL HISTORY:  Hypertension      High cholesterol      Hyperthyroidism      H/O lumpectomy      History of cholecystectomy

## 2024-09-23 NOTE — CAREGIVER ENGAGEMENT NOTE - CAREGIVER OUTREACH NOTES - FREE TEXT
CM met with patient's daughter, Lien, at bedside, introduced self and explained role of CM and transitional care planning. She verbalized understanding.

## 2024-09-23 NOTE — PROGRESS NOTE ADULT - PROBLEM SELECTOR PLAN 1
Pt BIBEMS to the ED due to sudden weakness, feeling clammy and loss of vision. suspect 2/2 dehydration, vs URI, vs medication induced  -Pt has hx of HTN on multiple BP meds see below   -In ED BP was 55/35, given 2 L bolus. BP fluid responsive--> inc to 80/56-->  137/92  -Hold home BP meds  -Continue to monitor hemodynamics and new/worsening symptoms  - cardio recommends initiating antihypertensives  - c/w atenolol 25mg PO qd, see how patient tolerates, will eventually be restarted on home BP meds to see if tolerable  - hold parameters   - Neuro to be consulted tomorrow; awaiting recs

## 2024-09-24 ENCOUNTER — TRANSCRIPTION ENCOUNTER (OUTPATIENT)
Age: 69
End: 2024-09-24

## 2024-09-24 VITALS
OXYGEN SATURATION: 95 % | DIASTOLIC BLOOD PRESSURE: 68 MMHG | HEART RATE: 66 BPM | SYSTOLIC BLOOD PRESSURE: 126 MMHG | RESPIRATION RATE: 20 BRPM | TEMPERATURE: 98 F

## 2024-09-24 LAB
ALBUMIN SERPL ELPH-MCNC: 2.9 G/DL — LOW (ref 3.3–5)
ALP SERPL-CCNC: 120 U/L — SIGNIFICANT CHANGE UP (ref 40–120)
ALT FLD-CCNC: 32 U/L — SIGNIFICANT CHANGE UP (ref 12–78)
ANION GAP SERPL CALC-SCNC: 7 MMOL/L — SIGNIFICANT CHANGE UP (ref 5–17)
AST SERPL-CCNC: 25 U/L — SIGNIFICANT CHANGE UP (ref 15–37)
BASOPHILS # BLD AUTO: 0.01 K/UL — SIGNIFICANT CHANGE UP (ref 0–0.2)
BASOPHILS NFR BLD AUTO: 0.2 % — SIGNIFICANT CHANGE UP (ref 0–2)
BILIRUB SERPL-MCNC: 0.8 MG/DL — SIGNIFICANT CHANGE UP (ref 0.2–1.2)
BUN SERPL-MCNC: 9 MG/DL — SIGNIFICANT CHANGE UP (ref 7–23)
CALCIUM SERPL-MCNC: 9.5 MG/DL — SIGNIFICANT CHANGE UP (ref 8.5–10.1)
CHLORIDE SERPL-SCNC: 113 MMOL/L — HIGH (ref 96–108)
CO2 SERPL-SCNC: 22 MMOL/L — SIGNIFICANT CHANGE UP (ref 22–31)
CREAT SERPL-MCNC: 0.94 MG/DL — SIGNIFICANT CHANGE UP (ref 0.5–1.3)
EGFR: 66 ML/MIN/1.73M2 — SIGNIFICANT CHANGE UP
EOSINOPHIL # BLD AUTO: 0.26 K/UL — SIGNIFICANT CHANGE UP (ref 0–0.5)
EOSINOPHIL NFR BLD AUTO: 4.1 % — SIGNIFICANT CHANGE UP (ref 0–6)
GLUCOSE SERPL-MCNC: 91 MG/DL — SIGNIFICANT CHANGE UP (ref 70–99)
HCT VFR BLD CALC: 35.2 % — SIGNIFICANT CHANGE UP (ref 34.5–45)
HGB BLD-MCNC: 12.1 G/DL — SIGNIFICANT CHANGE UP (ref 11.5–15.5)
IMM GRANULOCYTES NFR BLD AUTO: 0.2 % — SIGNIFICANT CHANGE UP (ref 0–0.9)
LYMPHOCYTES # BLD AUTO: 1.28 K/UL — SIGNIFICANT CHANGE UP (ref 1–3.3)
LYMPHOCYTES # BLD AUTO: 20.1 % — SIGNIFICANT CHANGE UP (ref 13–44)
MCHC RBC-ENTMCNC: 29.4 PG — SIGNIFICANT CHANGE UP (ref 27–34)
MCHC RBC-ENTMCNC: 34.4 GM/DL — SIGNIFICANT CHANGE UP (ref 32–36)
MCV RBC AUTO: 85.6 FL — SIGNIFICANT CHANGE UP (ref 80–100)
MONOCYTES # BLD AUTO: 0.63 K/UL — SIGNIFICANT CHANGE UP (ref 0–0.9)
MONOCYTES NFR BLD AUTO: 9.9 % — SIGNIFICANT CHANGE UP (ref 2–14)
NEUTROPHILS # BLD AUTO: 4.18 K/UL — SIGNIFICANT CHANGE UP (ref 1.8–7.4)
NEUTROPHILS NFR BLD AUTO: 65.5 % — SIGNIFICANT CHANGE UP (ref 43–77)
NRBC # BLD: 0 /100 WBCS — SIGNIFICANT CHANGE UP (ref 0–0)
PLATELET # BLD AUTO: 217 K/UL — SIGNIFICANT CHANGE UP (ref 150–400)
POTASSIUM SERPL-MCNC: 3.4 MMOL/L — LOW (ref 3.5–5.3)
POTASSIUM SERPL-SCNC: 3.4 MMOL/L — LOW (ref 3.5–5.3)
PROT SERPL-MCNC: 7.5 G/DL — SIGNIFICANT CHANGE UP (ref 6–8.3)
RBC # BLD: 4.11 M/UL — SIGNIFICANT CHANGE UP (ref 3.8–5.2)
RBC # FLD: 11.9 % — SIGNIFICANT CHANGE UP (ref 10.3–14.5)
SODIUM SERPL-SCNC: 142 MMOL/L — SIGNIFICANT CHANGE UP (ref 135–145)
WBC # BLD: 6.37 K/UL — SIGNIFICANT CHANGE UP (ref 3.8–10.5)
WBC # FLD AUTO: 6.37 K/UL — SIGNIFICANT CHANGE UP (ref 3.8–10.5)

## 2024-09-24 PROCEDURE — 99232 SBSQ HOSP IP/OBS MODERATE 35: CPT

## 2024-09-24 PROCEDURE — 99239 HOSP IP/OBS DSCHRG MGMT >30: CPT | Mod: GC

## 2024-09-24 RX ORDER — PANTOPRAZOLE SODIUM 40 MG/1
40 TABLET, DELAYED RELEASE ORAL
Refills: 0 | DISCHARGE

## 2024-09-24 RX ORDER — ATENOLOL 25 MG/1
1 TABLET ORAL
Qty: 14 | Refills: 0
Start: 2024-09-24 | End: 2024-10-07

## 2024-09-24 RX ORDER — APIXABAN 5 MG/1
1 TABLET, FILM COATED ORAL
Qty: 60 | Refills: 0
Start: 2024-09-24 | End: 2024-10-23

## 2024-09-24 RX ORDER — LOSARTAN POTASSIUM 100 MG/1
1 TABLET, FILM COATED ORAL
Refills: 0 | DISCHARGE

## 2024-09-24 RX ORDER — ESCITALOPRAM OXALATE 10 MG
1 TABLET ORAL
Qty: 14 | Refills: 0
Start: 2024-09-24 | End: 2024-10-07

## 2024-09-24 RX ORDER — METHIMAZOLE 5 MG/1
1 TABLET ORAL
Refills: 0 | DISCHARGE

## 2024-09-24 RX ORDER — METHIMAZOLE 5 MG/1
3 TABLET ORAL
Qty: 42 | Refills: 0
Start: 2024-09-24 | End: 2024-10-07

## 2024-09-24 RX ORDER — ATENOLOL 25 MG/1
1 TABLET ORAL
Refills: 0 | DISCHARGE

## 2024-09-24 RX ORDER — APIXABAN 5 MG/1
1 TABLET, FILM COATED ORAL
Qty: 28 | Refills: 0
Start: 2024-09-24 | End: 2024-10-23

## 2024-09-24 RX ORDER — MULTI VITAMIN/MINERAL SUPPLEMENT WITH ASCORBIC ACID, NIACIN, PYRIDOXINE, PANTOTHENIC ACID, FOLIC ACID, RIBOFLAVIN, THIAMIN, BIOTIN, COBALAMIN AND ZINC. 60; 20; 12.5; 10; 10; 1.7; 1.5; 1; .3; .006 MG/1; MG/1; MG/1; MG/1; MG/1; MG/1; MG/1; MG/1; MG/1; MG/1
1 TABLET, COATED ORAL
Refills: 0 | DISCHARGE

## 2024-09-24 RX ORDER — ESCITALOPRAM OXALATE 10 MG
5 TABLET ORAL DAILY
Refills: 0 | Status: DISCONTINUED | OUTPATIENT
Start: 2024-09-24 | End: 2024-09-24

## 2024-09-24 RX ORDER — MULTI VITAMIN/MINERAL SUPPLEMENT WITH ASCORBIC ACID, NIACIN, PYRIDOXINE, PANTOTHENIC ACID, FOLIC ACID, RIBOFLAVIN, THIAMIN, BIOTIN, COBALAMIN AND ZINC. 60; 20; 12.5; 10; 10; 1.7; 1.5; 1; .3; .006 MG/1; MG/1; MG/1; MG/1; MG/1; MG/1; MG/1; MG/1; MG/1; MG/1
1 TABLET, COATED ORAL
Qty: 0 | Refills: 0 | DISCHARGE
Start: 2024-09-24

## 2024-09-24 RX ORDER — PANTOPRAZOLE SODIUM 40 MG/1
1 TABLET, DELAYED RELEASE ORAL
Qty: 0 | Refills: 0 | DISCHARGE
Start: 2024-09-24

## 2024-09-24 RX ADMIN — Medication 5 MILLIGRAM(S): at 11:58

## 2024-09-24 RX ADMIN — Medication 40 MILLIEQUIVALENT(S): at 11:58

## 2024-09-24 RX ADMIN — MULTI VITAMIN/MINERAL SUPPLEMENT WITH ASCORBIC ACID, NIACIN, PYRIDOXINE, PANTOTHENIC ACID, FOLIC ACID, RIBOFLAVIN, THIAMIN, BIOTIN, COBALAMIN AND ZINC. 1 TABLET(S): 60; 20; 12.5; 10; 10; 1.7; 1.5; 1; .3; .006 TABLET, COATED ORAL at 11:59

## 2024-09-24 RX ADMIN — ATENOLOL 25 MILLIGRAM(S): 25 TABLET ORAL at 05:29

## 2024-09-24 RX ADMIN — PANTOPRAZOLE SODIUM 40 MILLIGRAM(S): 40 TABLET, DELAYED RELEASE ORAL at 11:58

## 2024-09-24 RX ADMIN — APIXABAN 2.5 MILLIGRAM(S): 5 TABLET, FILM COATED ORAL at 05:29

## 2024-09-24 RX ADMIN — APIXABAN 2.5 MILLIGRAM(S): 5 TABLET, FILM COATED ORAL at 17:23

## 2024-09-24 RX ADMIN — TRAMADOL HYDROCHLORIDE 50 MILLIGRAM(S): 50 TABLET, COATED ORAL at 16:37

## 2024-09-24 NOTE — DISCHARGE NOTE PROVIDER - ATTENDING DISCHARGE PHYSICAL EXAMINATION:
T(C): 36.8 (09-24-24 @ 12:15), Max: 36.9 (09-23-24 @ 20:12)  HR: 66 (09-24-24 @ 12:15) (60 - 66)  BP: 126/68 (09-24-24 @ 12:15) (115/65 - 148/70)  RR: 20 (09-24-24 @ 12:15) (18 - 20)  SpO2: 95% (09-24-24 @ 12:15) (93% - 95%)      PHYSICAL EXAM:  GENERAL: NAD  HEENT:  anicteric, moist mucous membranes  CHEST/LUNG:  CTA b/l, no rales, wheezes, or rhonchi  HEART:  RRR, S1, S2  ABDOMEN:  BS+, soft, nontender, nondistended  EXTREMITIES: no edema, cyanosis, or calf tenderness  NERVOUS SYSTEM: answers questions and follows commands appropriately

## 2024-09-24 NOTE — PROGRESS NOTE ADULT - SUBJECTIVE AND OBJECTIVE BOX
[facility] -- Braxton Devries, Kemal Nguyễn Savella, Glen Briseno: Office # [phone]      Follow Up:  Hypotension, A fib, new onset    Subjective/Observations: Patient awake, alert, resting in bed. Denies chest pain, palpitations and dizziness. Denies any difficulty breathing. No orthopnea and PND. Tolerating room air.     REVIEW OF SYSTEMS: All other review of systems are negative unless indicated above    PAST MEDICAL & SURGICAL HISTORY:  Hyperthyroidism      High cholesterol      Hypertension      Hypertension      History of cholecystectomy      H/O lumpectomy    MEDICATIONS  (STANDING):  apixaban 2.5 milliGRAM(s) Oral every 12 hours  atenolol  Tablet 25 milliGRAM(s) Oral daily  atorvastatin 10 milliGRAM(s) Oral at bedtime  lidocaine 2% Gel 1 Application(s) Topical daily  methimazole 15 milliGRAM(s) Oral daily  multivitamin 1 Tablet(s) Oral daily  pantoprazole   Suspension 40 milliGRAM(s) Oral daily  potassium chloride    Tablet ER 40 milliEquivalent(s) Oral once  sodium chloride 0.9%. 1000 milliLiter(s) (70 mL/Hr) IV Continuous <Continuous>    MEDICATIONS  (PRN):  acetaminophen     Tablet .. 650 milliGRAM(s) Oral every 6 hours PRN Temp greater or equal to 38C (100.4F), Mild Pain (1 - 3)  melatonin 5 milliGRAM(s) Oral at bedtime PRN Insomnia  traMADol 50 milliGRAM(s) Oral every 6 hours PRN Severe Pain (7 - 10)    Allergies    No Known Allergies    Intolerances      Vital Signs Last 24 Hrs  T(C): 36.6 (24 Sep 2024 05:08), Max: 36.9 (23 Sep 2024 20:12)  T(F): 97.8 (24 Sep 2024 05:08), Max: 98.5 (23 Sep 2024 20:12)  HR: 61 (24 Sep 2024 05:08) (60 - 64)  BP: 148/70 (24 Sep 2024 05:08) (115/65 - 148/70)  BP(mean): --  RR: 18 (24 Sep 2024 05:08) (18 - 19)  SpO2: 93% (24 Sep 2024 05:08) (93% - 95%)    Parameters below as of 24 Sep 2024 05:08  Patient On (Oxygen Delivery Method): room air      I&O's Summary        TELE: SR 50-60s nonsustained 100s   PHYSICAL EXAM:  Constitutional: NAD, awake and alert,   HEENT: Moist Mucous Membranes, Anicteric  Pulmonary: Non-labored, breath sounds are clear bilaterally, No wheezing, rales or rhonchi  Cardiovascular: Regular, S1 and S2, No murmurs, No rubs, gallops or clicks  Gastrointestinal: Bowel Sounds present, soft, nontender.   Lymph: No peripheral edema. No lymphadenopathy.  Skin: No visible rashes or ulcers.  Psych:  Mood & affect appropriate        LABS: All Labs Reviewed:                        12.1   6.37  )-----------( 217      ( 24 Sep 2024 06:40 )             35.2                         12.2   6.70  )-----------( 200      ( 23 Sep 2024 07:45 )             35.4                         13.6   8.57  )-----------( 263      ( 22 Sep 2024 06:05 )             39.6     24 Sep 2024 06:40    142    |  113    |  9      ----------------------------<  91     3.4     |  22     |  0.94   23 Sep 2024 07:45    142    |  114    |  9      ----------------------------<  92     3.7     |  23     |  0.90   22 Sep 2024 06:05    144    |  113    |  11     ----------------------------<  93     3.5     |  24     |  1.10     Ca    9.5        24 Sep 2024 06:40  Ca    9.4        23 Sep 2024 07:45  Ca    9.5        22 Sep 2024 06:05    TPro  7.5    /  Alb  2.9    /  TBili  0.8    /  DBili  x      /  AST  25     /  ALT  32     /  AlkPhos  120    24 Sep 2024 06:40  TPro  7.4    /  Alb  3.0    /  TBili  0.8    /  DBili  x      /  AST  28     /  ALT  34     /  AlkPhos  126    23 Sep 2024 07:45  TPro  8.4    /  Alb  3.5    /  TBili  0.8    /  DBili  x      /  AST  38     /  ALT  48     /  AlkPhos  147    22 Sep 2024 06:05   LIVER FUNCTIONS - ( 24 Sep 2024 06:40 )  Alb: 2.9 g/dL / Pro: 7.5 g/dL / ALK PHOS: 120 U/L / ALT: 32 U/L / AST: 25 U/L / GGT: x           Troponin I, High Sensitivity Result: 11.4 ng/L (09-21-24 @ 16:05)  Triiodothyronine, Total (T3 Total): 172 ng/dL (09-21-24 @ 16:05)  Lactate, Blood: 1.6 mmol/L (09-21-24 @ 21:05)  Lactate, Blood: 4.2 mmol/L (09-21-24 @ 16:05)    12 Lead ECG:   Ventricular Rate 85 BPM    Atrial Rate 85 BPM    P-R Interval 144 ms    QRS Duration 88 ms    Q-T Interval 356 ms    QTC Calculation(Bazett) 423 ms    P Axis 45 degrees    R Axis 13 degrees    T Axis 10 degrees    Diagnosis Line Normal sinus rhythm  Normal ECG  No previous ECGs available  Confirmed by Daniel Limon MD ([id number]) on 9/22/2024 11:51:21 AM (09-22-24 @ 01:19)

## 2024-09-24 NOTE — CASE MANAGEMENT PROGRESS NOTE - NSCMPROGRESSNOTE_GEN_ALL_CORE
Spoke to patient's son in law Amarilis [place] ([phone]) to discuss home care/PT and explained the role of CM with informed understanding. As per Amarilis, patient and patient's daughter refused home PT. Re-educated him the benefits of home PT and still refused.

## 2024-09-24 NOTE — PROGRESS NOTE ADULT - ASSESSMENT
67 yo woman w hyper/hypothyroid on methimazole known of enlarged thyroid for years and has delayed surgery(now planned and has surgery followup), Stage IA pT1bN0  left breast ca 2017 post lumpectomy [facility] (6mm w additional DCIS, ERPR positive, Mik0Nri negative) post adj RT and 5yrs of hormne prophylaxis(could not recall name)  Pt has been taking care of ill adriánad who recently  from cancer and neglicting own care  Recently had mammo as outpatient(new Radiology facility, has not gone for mammo since COVID pandemic), and found left breast abnormality for MRI and bx next Tuesday.  Incr mid back pain x 2 wks, attributed to lifting and moving ill . seen last  in ER for sever back pain(), then PCP on , given 2 new meds, pain subsided on Monday but w the medicaton incr weakness, unsteadiness, and now adm to [facility]  w/u-  24 CTQ chest- no PE, marked enlarged thyroid w low attenuation and calcif. Left breast 5x4.2cm carter rim-enhancing fluid attenuation density, left breast skini thickening. Heterogenous vertebra recommend Bone Scan.  24 CT head no acute findings  24 CT c/a/p again seen the marked enlarged mutinodular thyroid, left breast 4.8cm fluid atten lesion felt could be post op seroma. Bone w scoliosis/degen changes. Liver periportal edema, Pancreas sm calcif  Labs mild incr alk phos and second lab min incr SGOT  Positive for enterovirus/rhinovirus    -left breast finding may be due to post surgical and RT changes of skin and seroma  -Bone Scan negative, CA 27.29 normal  -MRI Tspine w marrow edema, paravertebral enhancement, no osseous compromise --overall feels more c/w trauma than mets  -MRI  head no acute changes  -overall findings not c/w met breast ca, met malignancy  -stable from Heme/Onc standpoint  -pt will continue followup as outpatient    discussed w son in law  will sign off for now, please call if any questions

## 2024-09-24 NOTE — DISCHARGE NOTE PROVIDER - NPI NUMBER (FOR SYSADMIN USE ONLY) :
[[id number] [[id number] [[id number]]

## 2024-09-24 NOTE — DISCHARGE NOTE PROVIDER - CARE PROVIDERS DIRECT ADDRESSES
,DirectAddress_Unknown,DirectAddress_Unknown,[email or web address] ,DirectAddress_Unknown,DirectAddress_Unknown,[email or web address] ,DirectAddress_Unknown,DirectAddress_Unknown,[email or web address]

## 2024-09-24 NOTE — DISCHARGE NOTE PROVIDER - NSDCMRMEDTOKEN_GEN_ALL_CORE_FT
acetaminophen: 500 milligram(s) orally 4 times a day as needed for  mild pain for 15 days  atenolol 100 mg oral tablet: 1 tab(s) orally once a day  Calcium 600mg with Vitamin D 10 mcg:   cholecalciferol: 50 microgram(s) orally once a day  cyclobenzaprine 10 mg oral tablet: 1 tab(s) orally once a day as needed for  muscle spasm for 10 days  Daily Ac oral tablet: 1 tab(s) orally  diclofenac 1% topical gel: Apply topically to affected area 2 times a day  losartan 25 mg oral tablet: 1 tab(s) orally once a day  methIMAzole 10 mg oral tablet: 1 tab(s) orally 2 times a day  naproxen 250 mg oral tablet: 1 tab(s) orally 2 times a day as needed for  moderate pain  NIFEdipine: 90 milligram(s) orally once a day  Pantoprazole: 40 milligram(s) orally once a day  simvastatin 20 mg oral tablet: 1 tab(s) orally once a day  traMADol 50 mg oral tablet: 1 tab(s) orally once a day as needed for  moderate pain for 10 days   acetaminophen: 500 milligram(s) orally 4 times a day as needed for  mild pain for 15 days  apixaban 2.5 mg oral tablet: 1 tab(s) orally  atenolol 25 mg oral tablet: 1 tab(s) orally once a day  Calcium 600mg with Vitamin D 10 mcg:   cholecalciferol: 50 microgram(s) orally once a day  cyclobenzaprine 10 mg oral tablet: 1 tab(s) orally once a day as needed for  muscle spasm for 10 days  diclofenac 1% topical gel: Apply topically to affected area 2 times a day  escitalopram 5 mg oral tablet: 1 tab(s) orally once a day  methIMAzole 5 mg oral tablet: 3 tab(s) orally once a day  Multiple Vitamins oral tablet: 1 tab(s) orally once a day  naproxen 250 mg oral tablet: 1 tab(s) orally 2 times a day as needed for  moderate pain  pantoprazole 40 mg oral granule, delayed release: 1 tab(s) orally once a day  simvastatin 20 mg oral tablet: 1 tab(s) orally once a day  traMADol 50 mg oral tablet: 1 tab(s) orally once a day as needed for  moderate pain for 10 days   acetaminophen: 500 milligram(s) orally 4 times a day as needed for  mild pain for 15 days  apixaban 2.5 mg oral tablet: 1 tab(s) orally 2 times a day  atenolol 25 mg oral tablet: 1 tab(s) orally once a day  Calcium 600mg with Vitamin D 10 mcg:   cholecalciferol: 50 microgram(s) orally once a day  cyclobenzaprine 10 mg oral tablet: 1 tab(s) orally once a day as needed for  muscle spasm for 10 days  diclofenac 1% topical gel: Apply topically to affected area 2 times a day  escitalopram 5 mg oral tablet: 1 tab(s) orally once a day  methIMAzole 5 mg oral tablet: 3 tab(s) orally once a day  Multiple Vitamins oral tablet: 1 tab(s) orally once a day  naproxen 250 mg oral tablet: 1 tab(s) orally 2 times a day as needed for  moderate pain  pantoprazole 40 mg oral granule, delayed release: 1 tab(s) orally once a day  simvastatin 20 mg oral tablet: 1 tab(s) orally once a day  traMADol 50 mg oral tablet: 1 tab(s) orally once a day as needed for  moderate pain for 10 days

## 2024-09-24 NOTE — PROGRESS NOTE ADULT - ASSESSMENT
68-year-old female with a PMHx of hyperthyroidism on methimazole, hypertension, breast CA 2011 s/p lumpectomy, chemo, radiation, and HLD presents to the ED BIBEMS with weakness admitted for hypotension, new onset afib, and supportive care for Entero/Rhinovirus.     - Pt found to have new onset A-fib in ER, was in A fib in ER, but converted to SR 9/21 by the time she came to tele.   - Likely in the setting of hyperthyroidism and viral infection   - EKG Afib @ 52  - EKG 9/22 am with SR @ 85  - Tele w/ SR 60-80s, nonsustained 100s, Can d/c telemetry  - Not on AV sanna agents   - She is likely to have recurrences given her hyperthyroid state, and that based on age, htn and malignancy her risk of cardioembolic stroke seems elevated  - Started on Eliquis, but patient should be on Eliquis 5 mg PO BID   -  Would need to prove that this is a one time issue before considering her to be sufficiently low risk to stop ac     - BP low on admission to 55/35, S/p IVF  - BP now improved  - Resumed Atenolol 25mg Qday    - EKG w/ A fib @ 52  - EKG 9/22 am with SR @ 85  - Troponin: <-11.4  - No evidence of any active ischemia   - Continue statin     - Pt found to have positive RVP on admission for enterovirus  - Continue supportive measures and NS IV fluids  - Pt has hx of breast cancer, was scheduled for out pt MRI and breast biopsy  - Heme/onc following to r/o malignancy   - Management of hyperthyroidism per primary. Endocrine following     - Monitor and replete lytes, keep K>4, Mg>2.  - Will continue to follow.    Lorri Morales, MS FNP, AGACNP  Nurse Practitioner- Cardiology   Please call on TEAMS

## 2024-09-24 NOTE — DISCHARGE NOTE NURSING/CASE MANAGEMENT/SOCIAL WORK - PATIENT PORTAL LINK FT
You can access the FollowMyHealth Patient Portal offered by [facility] by registering at the following website: [email or web address]. By joining [facility]’s FollowMyHealth portal, you will also be able to view your health information using other applications (apps) compatible with our system.

## 2024-09-24 NOTE — CONSULT NOTE ADULT - SUBJECTIVE AND OBJECTIVE BOX
Patient is a 68y Female who presents c/o mid-thoracic back pain sp trying to catch a family member falling 5 months ago.  She states she has had pain since then, notes that it has improved.  Denies trauma. She currently ambulates with a walker which is baseline for her. Denies HS/LOC. Denies pain/injury elsewhere. Denies numbness/tingling/paresthesias/weakness. Denies bowel/bladder incontinence. Denies fevers/chills. No other complaints at this time.    HEALTH ISSUES - PROBLEM Dx:  Hypotension    HTN (hypertension)    Hyperthyroidism    High cholesterol    Rhinovirus infection    Need for prophylactic measure    New onset a-fib    Back pain    Confusion            MEDICATIONS  (STANDING):  apixaban 2.5 milliGRAM(s) Oral every 12 hours  atenolol  Tablet 25 milliGRAM(s) Oral daily  atorvastatin 10 milliGRAM(s) Oral at bedtime  escitalopram 5 milliGRAM(s) Oral daily  lidocaine 2% Gel 1 Application(s) Topical daily  methimazole 15 milliGRAM(s) Oral daily  multivitamin 1 Tablet(s) Oral daily  pantoprazole   Suspension 40 milliGRAM(s) Oral daily  sodium chloride 0.9%. 1000 milliLiter(s) IV Continuous <Continuous>      Allergies    No Known Allergies    Intolerances        PAST MEDICAL & SURGICAL HISTORY:  Hypothyroidism    Hyperthyroidism    High cholesterol    Hypertension    History of cholecystectomy    H/O lumpectomy                              12.1   6.37  )-----------( 217      ( 24 Sep 2024 06:40 )             35.2       24 Sep 2024 06:40    142    |  113    |  9      ----------------------------<  91     3.4     |  22     |  0.94     Ca    9.5        24 Sep 2024 06:40    TPro  7.5    /  Alb  2.9    /  TBili  0.8    /  DBili  x      /  AST  25     /  ALT  32     /  AlkPhos  120    24 Sep 2024 06:40          Urinalysis Basic - ( 24 Sep 2024 06:40 )    Color: x / Appearance: x / SG: x / pH: x  Gluc: 91 mg/dL / Ketone: x  / Bili: x / Urobili: x   Blood: x / Protein: x / Nitrite: x   Leuk Esterase: x / RBC: x / WBC x   Sq Epi: x / Non Sq Epi: x / Bacteria: x        Vital Signs Last 24 Hrs  T(C): 36.6 (09-24-24 @ 05:08), Max: 36.9 (09-23-24 @ 20:12)  T(F): 97.8 (09-24-24 @ 05:08), Max: 98.5 (09-23-24 @ 20:12)  HR: 61 (09-24-24 @ 05:08) (60 - 64)  BP: 148/70 (09-24-24 @ 05:08) (115/65 - 148/70)  BP(mean): --  RR: 18 (09-24-24 @ 05:08) (18 - 19)  SpO2: 93% (09-24-24 @ 05:08) (93% - 95%)    Physical Exam:  Gen: NAD  Spine:  Skin intact  No gross deformity  No midline TTP C/T/L/S spine  No bony step offs  No paraspinal muscle ttp/hypertonicity   Negative Straight leg raise  Negative clonus  Negative babinski  Negative abrams  + rectal tone  No saddle anesthesia    Motor:                   C5                C6              C7               C8           T1   R            5/5                5/5            5/5             5/5          5/5  L             5/5               5/5             5/5             5/5          5/5                L2             L3             L4               L5            S1  R         5/5           5/5          5/5             5/5           5/5  L          5/5          5/5           5/5             5/5           5/5    Sensory:            C5         C6         C7      C8       T1        (0=absent, 1=impaired, 2=normal, NT=not testable)  R         2            2           2        2         2  L          2            2           2        2         2               L2          L3         L4      L5       S1         (0=absent, 1=impaired, 2=normal, NT=not testable)  R         2            2            2        2        2  L          2            2           2        2         2    Imaging:     < from: MR Thoracic Spine w/wo IV Cont (09.23.24 @ 19:52) >  IMPRESSION:  1. Nondepressed endplate fractures with Schmorl's nodes and intense   subjacent vertebral body marrow edema-hyperemia at T7, T8 and T9, as well   as to lesser extent at T5, without focal osseous lesion. Paravertebral   enhancement at these levels, extending into the left T7-T8 and T8-T9   neural foramen, possibly reactive edema-granulation tissue. No enhancing   epidural or paraspinal soft tissue mass. No abnormal leptomeningeal   enhancement. The constellation of findings, in conjunction with the   recent prior bone scan findings, favor a degenerative/traumatic etiology.   Metastatic disease is considered lesslikely, however follow-up imaging   should be considered if this is a persistent concern.  2. Additional findings, including those degenerative, described in detail   above.      < end of copied text >      A/P: 68y Female with T5, T7-9 End plate fracturee  Pain control  WBAT with assistive devices as needed  FU Labs/imaging  SCDs  FU as outpt, Will discuss with Dr Cuevas.

## 2024-09-24 NOTE — PROGRESS NOTE ADULT - SUBJECTIVE AND OBJECTIVE BOX
All interim records and events noted.    awake in bed, comfortable at present  son in law present and actis as interpretor      MEDICATIONS  (STANDING):  apixaban 2.5 milliGRAM(s) Oral every 12 hours  atenolol  Tablet 25 milliGRAM(s) Oral daily  atorvastatin 10 milliGRAM(s) Oral at bedtime  escitalopram 5 milliGRAM(s) Oral daily  lidocaine 2% Gel 1 Application(s) Topical daily  methimazole 15 milliGRAM(s) Oral daily  multivitamin 1 Tablet(s) Oral daily  pantoprazole   Suspension 40 milliGRAM(s) Oral daily  sodium chloride 0.9%. 1000 milliLiter(s) (70 mL/Hr) IV Continuous <Continuous>    MEDICATIONS  (PRN):  acetaminophen     Tablet .. 650 milliGRAM(s) Oral every 6 hours PRN Temp greater or equal to 38C (100.4F), Mild Pain (1 - 3)  melatonin 5 milliGRAM(s) Oral at bedtime PRN Insomnia  traMADol 50 milliGRAM(s) Oral every 6 hours PRN Severe Pain (7 - 10)      Vital Signs Last 24 Hrs  T(C): 36.6 (24 Sep 2024 05:08), Max: 36.9 (23 Sep 2024 20:12)  T(F): 97.8 (24 Sep 2024 05:08), Max: 98.5 (23 Sep 2024 20:12)  HR: 61 (24 Sep 2024 05:08) (60 - 64)  BP: 148/70 (24 Sep 2024 05:08) (115/65 - 148/70)  BP(mean): --  RR: 18 (24 Sep 2024 05:08) (18 - 19)  SpO2: 93% (24 Sep 2024 05:08) (93% - 95%)    Parameters below as of 24 Sep 2024 05:08  Patient On (Oxygen Delivery Method): room air        PHYSICAL EXAM  General: in no acute distress  Head: atraumatic, normocephalic  ENT: sclera anicteric, buccal mucosa moist  Neck: supple, trachea midline  CV: S1 S2, regular rate and rhythm  Lungs: clear to auscultation, no wheezes/rhonchi  Abdomen: soft, nontender, bowel sounds present, no palpable masses. Pouchy  Extrem: no clubbing/cyanosis/edema  Skin: no significant increased ecchymosis/petechiae  Neuro: alert and oriented X3,  no focal deficits      LABS:             12.1   6.37  )-----------( 217      ( 09-24 @ 06:40 )             35.2                12.2   6.70  )-----------( 200      ( 09-23 @ 07:45 )             35.4                13.6   8.57  )-----------( 263      ( 09-22 @ 06:05 )             39.6                12.5   8.05  )-----------( 293      ( 09-21 @ 16:05 )             38.1       09-24    142  |  113[H]  |  9   ----------------------------<  91  3.4[L]   |  22  |  0.94    Ca    9.5      24 Sep 2024 06:40    TPro  7.5  /  Alb  2.9[L]  /  TBili  0.8  /  DBili  x   /  AST  25  /  ALT  32  /  AlkPhos  120  09-24 09-21 @ 16:05  PT11.6 INR1.02  PTT37.3    Cancer Antigen, Ca 27.29: 22.8: METHOD: [employer] Chemiluminescent Immunoassay   Values obtained with different assay methods or kits cannot be used   interchangeably.   Results cannot be interpreted as absolute evidence of the presence or   absence of malignant disease. U/mL (09.23.24 @ 07:45)     RADIOLOGY & ADDITIONAL STUDIES:  < from: MR Thoracic Spine w/wo IV Cont (09.23.24 @ 19:52) >    ACC: [id number] EXAM:  MR SPINE THORACIC WAW IC   ORDERED BY: SAEED GALLEGOS     PROCEDURE DATE:  09/23/2024          INTERPRETATION:  CLINICAL STATEMENT: Back pain. History of breast   carcinoma.    TECHNIQUE: Multiplanar multisequence MRI thoracic spine, WITHOUT and WITH   intravenous contrast. No complications to the intravenous administration   of 6.5 cc of gadolinium-based contrast (Gadavist). 1 cc discarded.  COMPARISON: Correlation with bone scan 9/23/2024 and CT chest 9/21/2024.    FINDINGS:    Enlarged, multinodular thyroid gland incidentally noted.    No definitive abnormal cord signal, cord edema or atrophy. No visualized   intrathecal or paraspinal mass. Findings most concordant with multiple   thoracic perineural cysts, most pronounced in the lower segments.    Shallow rightward curvature and mild kyphosis. No subluxation.   Nondepressed endplate fractures with Schmorl's nodes and subjacent   vertebral body marrow edema at the T9 superior endplate, T8 superior and   inferior endplates, T7 inferior endplate, and to a lesser extent at the   T5 inferior endplate. Marrow edema extends into the posterior elements at   T8, left greater than right. No retropulsion.    Multilevel Schmorl's nodes, demonstrating surrounding marrow edema at   multiple levels. Multilevel disc space narrowing, varying degrees of   diffuse disc desiccation, multilevel endplate spurring and facet   arthrosis.    T1-T2: Disc bulge, without stenosis.  T2-T3: Disc bulge, resulting in mild bilateral neural foramen stenosis   with facet arthrosis.  T3-T4: Disc bulge, without stenosis.  T4-T5: Disc bulge, without stenosis.  T5-T6: Central disc protrusion, effacing the subarachnoid space, without   stenosis.  T6-T7: Disc bulge, without stenosis.  T7-T8: Disc bulge, without stenosis.  T8-T9: Disc bulge, without stenosis.  T9-T10: Disc bulge, resulting in moderate bilateral neural foramen   stenosis with facet arthrosis.  T10-T11: Disc bulge, without stenosis.  T11-T12: Disc bulge, without stenosis.  T12-L1: No disc protrusion or stenosis.    No enhancing epidural or paraspinal soft tissue mass. No abnormal   leptomeningeal enhancement. Enhancement at the site of the aforementioned   endplate fractures, with additional ill-defined enhancing intermediate T1   signal in the paravertebral regions at T7-T9, extending into the left   T7-T8 and T8-T9 neural foramen.    IMPRESSION:  1. Nondepressed endplate fractures with Schmorl's nodes and intense   subjacent vertebral body marrow edema-hyperemia at T7, T8 and T9, as well   as to lesser extent at T5, without focal osseous lesion. Paravertebral   enhancement at these levels, extending into the left T7-T8 and T8-T9   neural foramen, possibly reactive edema-granulation tissue. No enhancing   epidural or paraspinal soft tissue mass. No abnormal leptomeningeal   enhancement. The constellation of findings, in conjunction with the   recent prior bone scan findings, favor a degenerative/traumatic etiology.   Metastatic disease is considered lesslikely, however follow-up imaging   should be considered if this is a persistent concern.  2. Additional findings, including those degenerative, described in detail   above.    --- End of Report ---        < end of copied text >  < from: MR Head w/wo IV Cont (09.23.24 @ 19:38) >    ACC: [id number] EXAM:  MR BRAIN WAW IC   ORDERED BY: SAEED GALLEGOS     PROCEDURE DATE:  09/23/2024          INTERPRETATION:  CLINICAL INDICATIONS: AMS    COMPARISON: Head CT dated 9/21/2024    TECHNIQUE: MRI brain: Multiplanar, multisequence MR imaging of the brain   are obtained with and without the administration of 6.5 cc intravenous   Gadavist contrast. 1.0 cc of contrast was discarded    FINDINGS:  No abnormal leptomeningeal or parenchymal enhancement.  There is no abnormal restricted diffusion to suggest acute infarction.   Scattered periventricular and subcortical white matter T2 /FLAIR   hyperintensities are seen without mass effect, nonspecific, likely   representing minimal chronic microvascular changes. Normal T2 flow-voids   are seen within  the intracranial vasculature. The lateral ventricles and   cortical sulci are age-appropriate in size and configuration. There is no   mass, mass effect, or extra-axial fluid collection. There is no   susceptibility artifact to suggest hemorrhage. Midline structures are   normal. Moderate bilateral ocular proptosis, unchanged. Small left   mastoid air cell effusion. The visualized paranasal sinuses, right   mastoid air cells and orbits are otherwise unremarkable.      IMPRESSION: No acute infarction. No abnormal enhancement.    --- End of Report ---          < end of copied text >  < from: NM Bone Imaging Total (09.23.24 @ 14:46) >    ACC: [id number] EXAM:  NM BONE IMG WHOLE BODY   ORDERED BY: LATASHA WING     PROCEDURE DATE:  09/23/2024          INTERPRETATION:  RADIOPHARMACEUTICAL: 20.9 mCi Tc-99m HDP, I.V.    CLINICAL INFORMATION: 68 year old female with breast carcinoma; referred   for evaluation of osseous metastases.    COMPARISON: None    TECHNIQUE:  Anterior and posterior whole body images were obtained   approximately 2 hours following radiopharmaceutical administration.    FINDINGS:  There are degenerative changes in the spine and major joints.   There is physiologic distribution of radiopharmaceutical throughout  the   remainder of the visualized osseous structures. No foci of abnormally   increased or decreased activity suggestive of osseous metastases are   identified.    Both kidneys are visualized.    IMPRESSION: No radionuclide evidence of osseous metastases.    --- End of Report ---      < end of copied text >      IMPRESSION/RECOMMENDATIONS:

## 2024-09-24 NOTE — DISCHARGE NOTE PROVIDER - NSDCCPCAREPLAN_GEN_ALL_CORE_FT
PRINCIPAL DISCHARGE DIAGNOSIS  Diagnosis: Hypotension  Assessment and Plan of Treatment: Yuo were found to have low blood pressure on admission to the hospital. You were treated with IV fluids which imporved your blood pressure. Your home blood pressure medications were held in picture of low blood pressure. We restarted one of your medications at a lower dose. Please follow up with your PCP within 2 weeks to reassess blood pressure medications.      SECONDARY DISCHARGE DIAGNOSES  Diagnosis: Atrial fibrillation  Assessment and Plan of Treatment: You were found to have atrial firilation, an arrythmia of the heart. Given your hisotry of cancer, you were started on alondra hernando, a blood thinner to prevent clots from forming. Afib could be a result of your hyperthyroidism in picture of acute viral illness. Please folow up with your PCP within 2 weeks for reassessment and whether a blood thinner should be continued.    Diagnosis: Rhinovirus  Assessment and Plan of Treatment: You were found to have a viral infection. You were initially treated with antibiotics, and then monitored withou antibiotics. Signs of infection decreased during your hospital stay, ultimately resolving.    Diagnosis: Back pain  Assessment and Plan of Treatment: You had severe back pain. You were treated wt pain medications as needed. Based on imaging findings and history of prior cancer, there was concern for possible malignant spread to bone. You had an MR head, thoracic spine, and nuclear medicine bone scan done which revaled no metastatic disease. PT was asked to evaluate you prior to discharge.  Please follow up with pain management and PCP within 2 weeks.     PRINCIPAL DISCHARGE DIAGNOSIS  Diagnosis: Hypotension  Assessment and Plan of Treatment: Yuo were found to have low blood pressure on admission to the hospital. You were treated with IV fluids which improved your blood pressure. Your home blood pressure medications were held in picture of low blood pressure. We restarted one of your medications at a lower dose. Please follow up with your PCP within 2 weeks to reassess blood pressure medications.      SECONDARY DISCHARGE DIAGNOSES  Diagnosis: Atrial fibrillation  Assessment and Plan of Treatment: You were found to have atrial firilation, an arrythmia of the heart. Given your hisotry of cancer, you were started on alondra hernando, a blood thinner to prevent clots from forming. Afib could be a result of your hyperthyroidism in picture of acute viral illness. Please folow up with your PCP within 2 weeks for reassessment and whether a blood thinner should be continued.    Diagnosis: Rhinovirus  Assessment and Plan of Treatment: You were found to have a viral infection. You were initially treated with antibiotics, and then monitored withou antibiotics. Signs of infection decreased during your hospital stay, ultimately resolving.    Diagnosis: Back pain  Assessment and Plan of Treatment: You had severe back pain. You were treated wt pain medications as needed. Based on imaging findings and history of prior cancer, there was concern for possible malignant spread to bone. You had an MR head, thoracic spine, and nuclear medicine bone scan done which revaled no metastatic disease. PT was asked to evaluate you prior to discharge.  Please follow up with pain management and PCP within 2 weeks.    Diagnosis: Hyperthyroidism  Assessment and Plan of Treatment: You were found to have an enlarged thyroid gland. you were seen by endocrinology and continued on your home metimazole. Please follow up with endocrinology Dr. perlman at discharge. Please follow up with endocrine surgeon dr. dalton for evaluation of thyroid size.    Diagnosis: Depression, reactive  Assessment and Plan of Treatment: you have been diagnosed with depression likely 2/2 the death of your spouse. Please start taking lexapro 5mg daily. Please follow up with your PMD outpatient for further management.       PRINCIPAL DISCHARGE DIAGNOSIS  Diagnosis: Hypotension  Assessment and Plan of Treatment: Yuo were found to have low blood pressure on admission to the hospital. You were treated with IV fluids which improved your blood pressure. Your home blood pressure medications were held in picture of low blood pressure. We restarted one of your medications at a lower dose. Please follow up with your PCP within 2 weeks to reassess blood pressure medications.      SECONDARY DISCHARGE DIAGNOSES  Diagnosis: Atrial fibrillation  Assessment and Plan of Treatment: You were found to have atrial firilation, an arrythmia of the heart. Given your hisotry of cancer, you were started on alondra hernando, a blood thinner to prevent clots from forming. Afib could be a result of your hyperthyroidism in picture of acute viral illness. Please folow up with your PCP within 2 weeks for reassessment and whether a blood thinner should be continued.    Diagnosis: Rhinovirus  Assessment and Plan of Treatment: You were found to have a viral infection. You were initially treated with antibiotics, and then monitored withou antibiotics. Signs of infection decreased during your hospital stay, ultimately resolving.    Diagnosis: Back pain  Assessment and Plan of Treatment: You had severe back pain. You were treated wt pain medications as needed. Based on imaging findings and history of prior cancer, there was concern for possible malignant spread to bone. You had an MR head, thoracic spine, and nuclear medicine bone scan done which revaled no metastatic disease. Metastatic disease ruled out.  PT was asked to evaluate you prior to discharge.  Please follow up with pain management and PCP within 2 weeks.    Diagnosis: Hyperthyroidism  Assessment and Plan of Treatment: You were found to have an enlarged thyroid gland. you were seen by endocrinology and continued on your home metimazole. Please follow up with endocrinology Dr. perlman at discharge. Please follow up with endocrine surgeon dr. dalton for evaluation of thyroid size.    Diagnosis: Depression, reactive  Assessment and Plan of Treatment: you have been diagnosed with depression likely 2/2 the death of your spouse. Please start taking lexapro 5mg daily. Please follow up with your PMD outpatient for further management.

## 2024-09-24 NOTE — DISCHARGE NOTE PROVIDER - PROVIDER TOKENS
PROVIDER:[TOKEN:[[id number]],FOLLOWUP:[2 weeks]],PROVIDER:[TOKEN:[[id number]],ESTABLISHEDPATIENT:[T]],PROVIDER:[TOKEN:[[id number]]] PROVIDER:[TOKEN:[[id number]],FOLLOWUP:[2 weeks]],PROVIDER:[TOKEN:[[id number]],ESTABLISHEDPATIENT:[T]],PROVIDER:[TOKEN:[[id number]]],PROVIDER:[TOKEN:[[id number]]] PROVIDER:[TOKEN:[[id number]],FOLLOWUP:[2 weeks]],PROVIDER:[TOKEN:[[id number]],ESTABLISHEDPATIENT:[T]],PROVIDER:[TOKEN:[[id number]]],PROVIDER:[TOKEN:[[id number]]],PROVIDER:[TOKEN:[[id number]]],PROVIDER:[TOKEN:[[id number]]]

## 2024-09-24 NOTE — DISCHARGE NOTE PROVIDER - CARE PROVIDER_API CALL
Rogerio Sebastian  Physical/Rehab Medicine  [address] [place] [address]  Phone: [phone]  Fax: [phone]  Follow Up Time: 2 weeks    BART SIDHU  [address] [place] [address]  Phone: [phone]  Fax: [phone]  Established Patient  Follow Up Time:     Daniel Limon  Interventional Cardiology  [address] [place] [address]  Phone: [phone]  Fax: [phone]  Follow Up Time:    Rogerio Sebastian  Physical/Rehab Medicine  [address] [place] [address]  Phone: [phone]  Fax: [phone]  Follow Up Time: 2 weeks    [facility]  [address] [place] [address]  Phone: [phone]  Fax: [phone]  Established Patient  Follow Up Time:     Daniel Limon  Interventional Cardiology  [address] [place] [address]  Phone: [phone]  Fax: [phone]  Follow Up Time:     Mary Cuevas  Orthopaedic Surgery  [address] [facility], [address] [address]  Phone: [phone]  Fax: [phone]  Follow Up Time:    Rogerio Sebastian  Physical/Rehab Medicine  [address] [place] [address]  Phone: [phone]  Fax: [phone]  Follow Up Time: 2 weeks    [facility]  [address] [place] [address]  Phone: [phone]  Fax: [phone]  Established Patient  Follow Up Time:     Daniel Limon  Interventional Cardiology  [address] [place] [address]  Phone: [phone]  Fax: [phone]  Follow Up Time:     Mary Cuevas  Orthopaedic Surgery  [address] [facility], [address]  [place] [address]  Phone: [phone]  Fax: [phone]  Follow Up Time:     Perlman, Craig Douglas  Endocrinology/Metab/Diabetes  [address] [facility] [address]  [place] [address]  Phone: [phone]  Fax: [phone]  Follow Up Time:     Vinnie Booth  Surgery  [address] [facility], [address]  [place] [address]  Phone: [phone]  Fax: [phone]  Follow Up Time:

## 2024-09-24 NOTE — PHYSICAL THERAPY INITIAL EVALUATION ADULT - ADDITIONAL COMMENTS
Patient's son reports that patient lives in a PH with her daughter and son-in-law. Son reports that there are three steps to enter and no stairs inside the house. Son reports patient has one step in her room. Son states patient uses a cane and walker when she needs to but, does not always use them.

## 2024-09-24 NOTE — CASE MANAGEMENT PROGRESS NOTE - NSCMPROGRESSNOTE_GEN_ALL_CORE
Per PT, patient is recommended for home PT. Patient is planned for DC today. CM met with patient at bedside to discuss transitional care plan who deferred CM to her family. CM called patient's daughter, Lien, unable to reach. CM spoke with patient's son-in-law, Amarilsi, via phone. Discussed [facility] services for home PT and educated him on benefits of home care; he verbalized understanding, but wanted to speak with his wife before confirming acceptance. He will be transporting patient home later today. CM contacted [facility] (ph: [phone]) to inform them of patient's DC and request resumption of CDPAP services. DC summary was faxed to . CM will remains available.

## 2024-09-24 NOTE — DISCHARGE NOTE PROVIDER - HOSPITAL COURSE
[FreeTextEntry1] : 20 year old sister was diagnosed with hypermobile EDS in 2016 with negative TAADnext genetic panel from [employer] (22 genes). Father has suspected EDS, multiple dislocations, hypermobility, and a negative work-up for Marfan syndrome at the [facility] in his teenage years with normal echocardiograms. Dad reports having a central auditory processing disorder. Mom is 5'3" dad is 5'11". Dad has a hernia and is hypothyroid.  FROM ADMISSION H+P:   HPI:  Pt is a 68-year-old female with a PMHx of hyperthyroidism on methimazole, hypertension, breast CA 2011 s/p lumpectomy, chemo, radiation, and HLD presents to the ED BIBEMS with weakness. Daughter at bedside states that the pt came to the ED last Friday for severe back pain and was discharged home the same day. A CTA was performed which showed left breast peripheral rim-enhancing fluid attenuation density with overlying skin thickening. Also with enlarged thyroid levels which patient is aware about and there has been evaluation for possible surgery.There was also concern for bony metastasis on this scan. Then on Sunday patient continued to have intense back pain so daughter called her PCP and they went to her office. Pt was given multiple pain medications with relief of symptoms. Pt daughter states that today in the afternoon the pt said she felt unwell and was shaky, clammy and said she could not see well so they called EMS. Pt currently has no trouble seeing she just notes that she feels, "cold" and still has back pain. Daughter also adds that 2 weeks ago the patient was found to have an abnormal finding on mammogram so an MRI and breast biopsy were ordered.      Denies fever, chest pain, trouble breathing, vomiting, SOB, cough, abdominal pain, nausea, vomiting, diarrhea, constipation, urgency, or dysuria, headaches, changes in vision, dizziness, numbness, tingling.    Denies recent travel, recent antibiotic use, or sick contacts.    ED Course:   Vitals: BP: 55/35 --> 80/56 , HR: 69 RR:18 , Temp: 97.8F SpO2: 99% on RA  Labs:  Cr 1.5, Glucose 224, Lactate 4.2, TSH 0.18, +RVP (rhinovirus), Alk Phos 132, eGFR 38  UA: neg  CXR: IMPRESSION: No acute cardiopulmonary disease process.  CT Head: 1. No acute intracranial hemorrhage, mass effect, or midline shift. Bilateral optic globe proptosis  CT Chest/AP: Enlarged multinodular thyroid gland. Mild mediastinal lymphadenopathy.   Trace ascites. Possible periportal edema  EKG: Rate 52, interpretation slow A-fib    Received in the ED:  2L NS IV bolus, Zosyn IVPB 3.375g   (21 Sep 2024 21:49)      ---  HOSPITAL COURSE:   Patient was admitted for hypotension and was found to have new onset afib which spontaneously converted   ---  CONSULTANTS:   Endocrine: Dr. Craig Perlman  Heme/onc: Dr. Sylvie Acosta   Cardiology: Dr. Limon    ---  TIME SPENT:  I, the attending physician, was physically present for the key portions of the evaluation and management (E/M) service provided. The total amount of time spent reviewing the hospital notes, laboratory values, imaging findings, assessing/counseling the patient, discussing with consultant physicians, social work, nursing staff was -- minutes    ---  Primary care provider was made aware of plan for discharge:      [  ] NO     [  ] YES   FROM ADMISSION H+P:   HPI:  Pt is a 68-year-old female with a PMHx of hyperthyroidism on methimazole, hypertension, breast CA 2011 s/p lumpectomy, chemo, radiation, and HLD presents to the ED BIBEMS with weakness. Daughter at bedside states that the pt came to the ED last Friday for severe back pain and was discharged home the same day. A CTA was performed which showed left breast peripheral rim-enhancing fluid attenuation density with overlying skin thickening. Also with enlarged thyroid levels which patient is aware about and there has been evaluation for possible surgery.There was also concern for bony metastasis on this scan. Then on Sunday patient continued to have intense back pain so daughter called her PCP and they went to her office. Pt was given multiple pain medications with relief of symptoms. Pt daughter states that today in the afternoon the pt said she felt unwell and was shaky, clammy and said she could not see well so they called EMS. Pt currently has no trouble seeing she just notes that she feels, "cold" and still has back pain. Daughter also adds that 2 weeks ago the patient was found to have an abnormal finding on mammogram so an MRI and breast biopsy were ordered.      Denies fever, chest pain, trouble breathing, vomiting, SOB, cough, abdominal pain, nausea, vomiting, diarrhea, constipation, urgency, or dysuria, headaches, changes in vision, dizziness, numbness, tingling.    Denies recent travel, recent antibiotic use, or sick contacts.    ED Course:   Vitals: BP: 55/35 --> 80/56 , HR: 69 RR:18 , Temp: 97.8F SpO2: 99% on RA  Labs:  Cr 1.5, Glucose 224, Lactate 4.2, TSH 0.18, +RVP (rhinovirus), Alk Phos 132, eGFR 38  UA: neg  CXR: IMPRESSION: No acute cardiopulmonary disease process.  CT Head: 1. No acute intracranial hemorrhage, mass effect, or midline shift. Bilateral optic globe proptosis  CT Chest/AP: Enlarged multinodular thyroid gland. Mild mediastinal lymphadenopathy.   Trace ascites. Possible periportal edema  EKG: Rate 52, interpretation slow A-fib    Received in the ED:  2L NS IV bolus, Zosyn IVPB 3.375g   (21 Sep 2024 21:49)      ---  HOSPITAL COURSE:   Patient was admitted for hypotension and was found to have new onset afib which spontaneously converted while in ED. Patients hypotension resolved with IV fluid administration. Patients heart rate was stable while inpatient, was started on eliquis 5mg PO given elevated CHADVASC. As blood pressure and heart stabilized, patient's mental status improved. PAtient had severe back pain and there was concern for possible metastatic disease given CT findings. Patient had MR head, MR thoracic spine, and Nuclear medicine bone scan which revealed no osseous metastases, making metastatic disease less likely. Patient's atenolol was restarted as per cardio recommendations at a lower dose. Patient's home methimazole was continued. Electrolytes were monitored and corrected as needed.   ---  CONSULTANTS:   Endocrine: Dr. Craig Perlman  Heme/onc: Dr. Sylvie Acosta   Cardiology: Dr. Limon  Neuro: Dr. Pastor Garcia   PM&R: Dr. Rogerio Sebastian  ---  TIME SPENT:  I, the attending physician, was physically present for the key portions of the evaluation and management (E/M) service provided. The total amount of time spent reviewing the hospital notes, laboratory values, imaging findings, assessing/counseling the patient, discussing with consultant physicians, social work, nursing staff was -- minutes    ---  Primary care provider was made aware of plan for discharge:      [  ] NO     [ X ] YES   FROM ADMISSION H+P:   HPI:  Pt is a 68-year-old female with a PMHx of hyperthyroidism on methimazole, hypertension, breast CA 2011 s/p lumpectomy, chemo, radiation, and HLD presents to the ED BIBEMS with weakness. Daughter at bedside states that the pt came to the ED last Friday for severe back pain and was discharged home the same day. A CTA was performed which showed left breast peripheral rim-enhancing fluid attenuation density with overlying skin thickening. Also with enlarged thyroid levels which patient is aware about and there has been evaluation for possible surgery. There was also concern for bony metastasis on this scan. Then on Sunday patient continued to have intense back pain so daughter called her PCP and they went to her office. Pt was given multiple pain medications with relief of symptoms. Pt daughter states that today in the afternoon the pt said she felt unwell and was shaky, clammy and said she could not see well so they called EMS. Pt currently has no trouble seeing she just notes that she feels, "cold" and still has back pain. Daughter also adds that 2 weeks ago the patient was found to have an abnormal finding on mammogram so an MRI and breast biopsy were ordered.      Denies fever, chest pain, trouble breathing, vomiting, SOB, cough, abdominal pain, nausea, vomiting, diarrhea, constipation, urgency, or dysuria, headaches, changes in vision, dizziness, numbness, tingling.    Denies recent travel, recent antibiotic use, or sick contacts.    ED Course:   Vitals: BP: 55/35 --> 80/56 , HR: 69 RR:18 , Temp: 97.8F SpO2: 99% on RA  Labs:  Cr 1.5, Glucose 224, Lactate 4.2, TSH 0.18, +RVP (rhinovirus), Alk Phos 132, eGFR 38  UA: neg  CXR: IMPRESSION: No acute cardiopulmonary disease process.  CT Head: 1. No acute intracranial hemorrhage, mass effect, or midline shift. Bilateral optic globe proptosis  CT Chest/AP: Enlarged multinodular thyroid gland. Mild mediastinal lymphadenopathy.   Trace ascites. Possible periportal edema  EKG: Rate 52, interpretation slow A-fib    Received in the ED:  2L NS IV bolus, Zosyn IVPB 3.375g   (21 Sep 2024 21:49)      ---  HOSPITAL COURSE:   Patient was admitted for hypotension and was found to have new onset afib which spontaneously converted while in ED. Patients hypotension resolved with IV fluid administration. Patients heart rate was stable while inpatient, was started on eliquis 5mg PO given elevated CHADVASC. As blood pressure and heart stabilized, patient's mental status improved. Patient had severe back pain and there was concern for possible metastatic disease given CT findings. Patient had MR head, MR thoracic spine, and Nuclear medicine bone scan which revealed no osseous metastases, making metastatic disease less likely. Pain likely 2/2 degenerative changes noted on MRI. Patient was seen by orthopedics and no intervention was necessary. Patient's atenolol was restarted as per cardio recommendations at a lower dose. Patient's home methimazole was continued. Electrolytes were monitored and corrected as needed.   ---  CONSULTANTS:   Endocrine: Dr. Craig Perlman  Heme/onc: Dr. Sylvie Acosta   Cardiology: Dr. Limon  Neuro: Dr. Pastor Garcia   PM&R: Dr. Rogerio Sebastian  ---  TIME SPENT:  I, the attending physician, was physically present for the key portions of the evaluation and management (E/M) service provided. The total amount of time spent reviewing the hospital notes, laboratory values, imaging findings, assessing/counseling the patient, discussing with consultant physicians, social work, nursing staff was 40 minutes    ---

## 2024-09-24 NOTE — PROGRESS NOTE ADULT - SUBJECTIVE AND OBJECTIVE BOX
neuro cons dict  seen for cognitive deficit  related to underlying anxiety/depression following loss of  6 months ago  brain mri- negative for cva/mets  start lexapro 10 mg qd neuro cons dict  seen for cognitive deficit  related to underlying anxiety/depression following loss of  6 months ago  brain mri- negative for cva/mets  start lexapro 5 mg qd

## 2024-09-24 NOTE — PROGRESS NOTE ADULT - NS ATTEND AMEND GEN_ALL_CORE FT
68-year-old female with a PMHx of hyperthyroidism on methimazole, hypertension, breast CA 2011 s/p lumpectomy, chemo, radiation, and HLD presents to the ED BIBEMS with weakness admitted for hypotension, new onset afib, and supportive care for Entero/Rhinovirus.     - Pt found to have new onset A-fib in ER, was in A fib in ER, but converted to SR 9/21 by the time she came to tele.   - Likely in the setting of hyperthyroidism and viral infection   - EKG Afib @ 52. EKG 9/22 am with SR @ 85  - cont atenolol   - She is likely to have recurrences given her hyperthyroid state, and that based on age, htn and malignancy her risk of cardioembolic stroke seems elevated  - Would favor starting ac, and would need to prove that this is a one time issue before considering her to be sufficiently low risk to stop ac   - Started on Eliquis, but patient should be on Eliquis 5 mg PO BID   - endo for hyperthyroid    - Troponin: <-11.4  - No evidence of any active ischemia   - Continue statin     - Pt found to have positive RVP on admission for enterovirus  - Continue supportive measures and NS IV fluids
68-year-old female with a PMHx of hyperthyroidism on methimazole, hypertension, breast CA 2011 s/p lumpectomy, chemo, radiation, and HLD presents to the ED BIBEMS with weakness admitted for hypotension, new onset afib, and supportive care for Entero/Rhinovirus.     - Pt found to have new onset A-fib in ER, was in A fib in ER, but converted to SR 9/21 by the time she came to tele.   - Likely in the setting of hyperthyroidism and viral infection   - EKG Afib @ 52  - EKG 9/22 am with SR @ 85  - Tele w/ SR 60-80s, nonsustained 100s x 1   - Not on AV sanna agents   - She is likely to have recurrences given her hyperthyroid state, and that based on age, htn and malignancy her risk of cardioembolic stroke seems elevated  - Would favor starting ac, and would need to prove that this is a one time issue before considering her to be sufficiently low risk to stop ac   - On atenolol, losartan and nifedipine at home  - resume Atenolol 25mg Qday     - Troponin: <-11.4  - No evidence of any active ischemia   - Continue statin     - Pt found to have positive RVP on admission for enterovirus  - Continue supportive measures and NS IV fluids    - Pt has hx of breast cancer, was scheduled for out pt MRI and breast biopsy  - Heme/onc following to r/o malignancy   - Management of hyperthyroidism per primary. Endocrine following

## 2024-09-24 NOTE — PHYSICAL THERAPY INITIAL EVALUATION ADULT - PERTINENT HX OF CURRENT PROBLEM, REHAB EVAL
Pt is a 68-year-old female with a PMHx of hyperthyroidism on methimazole, hypertension, breast CA 2011 s/p lumpectomy, chemo, radiation, and HLD presents to the ED BIBEMS with weakness. Daughter at bedside states that the pt came to the ED last Friday for severe back pain and was discharged home the same day. A CTA was performed which showed left breast peripheral rim-enhancing fluid attenuation density with overlying skin thickening. Also with enlarged thyroid levels which patient is aware about and there has been evaluation for possible surgery.There was also concern for bony metastasis on this scan. Then on Sunday patient continued to have intense back pain so daughter called her PCP and they went to her office. Pt was given multiple pain medications with relief of symptoms. Pt daughter states that today in the afternoon the pt said she felt unwell and was shaky, clammy and said she could not see well so they called EMS. Pt currently has no trouble seeing she just notes that she feels, "cold" and still has back pain. Daughter also adds that 2 weeks ago the patient was found to have an abnormal finding on mammogram so an MRI and breast biopsy were ordered.      Denies fever, chest pain, trouble breathing, vomiting, SOB, cough, abdominal pain, nausea, vomiting, diarrhea, constipation, urgency, or dysuria, headaches, changes in vision, dizziness, numbness, tingling.    Denies recent travel, recent antibiotic use, or sick contacts.

## 2024-09-24 NOTE — PHYSICAL THERAPY INITIAL EVALUATION ADULT - GAIT TRAINING, PT EVAL
Patient will ambulate >100 feet independently within 3-5 sessions to allow patient to ambulate household distances.

## 2024-09-25 PROBLEM — Z00.00 ENCOUNTER FOR PREVENTIVE HEALTH EXAMINATION: Status: ACTIVE | Noted: 2024-09-25

## 2024-09-27 LAB
CULTURE RESULTS: SIGNIFICANT CHANGE UP
CULTURE RESULTS: SIGNIFICANT CHANGE UP
SPECIMEN SOURCE: SIGNIFICANT CHANGE UP
SPECIMEN SOURCE: SIGNIFICANT CHANGE UP

## 2024-09-30 PROBLEM — I10 ESSENTIAL (PRIMARY) HYPERTENSION: Chronic | Status: ACTIVE | Noted: 2024-09-21

## 2024-09-30 PROBLEM — E78.00 PURE HYPERCHOLESTEROLEMIA, UNSPECIFIED: Chronic | Status: ACTIVE | Noted: 2024-09-21

## 2024-09-30 PROBLEM — E05.90 THYROTOXICOSIS, UNSPECIFIED WITHOUT THYROTOXIC CRISIS OR STORM: Chronic | Status: ACTIVE | Noted: 2024-09-21

## 2024-10-01 LAB — T3 SERPL-MCNC: 141 NG/DL — SIGNIFICANT CHANGE UP

## 2024-10-04 ENCOUNTER — APPOINTMENT (OUTPATIENT)
Dept: CARDIOLOGY | Facility: CLINIC | Age: 69
End: 2024-10-04
Payer: MEDICARE

## 2024-10-04 ENCOUNTER — NON-APPOINTMENT (OUTPATIENT)
Age: 69
End: 2024-10-04

## 2024-10-04 VITALS
SYSTOLIC BLOOD PRESSURE: 185 MMHG | BODY MASS INDEX: 23.99 KG/M2 | HEART RATE: 69 BPM | OXYGEN SATURATION: 98 % | HEIGHT: 65 IN | WEIGHT: 144 LBS | DIASTOLIC BLOOD PRESSURE: 84 MMHG

## 2024-10-04 VITALS — SYSTOLIC BLOOD PRESSURE: 160 MMHG | DIASTOLIC BLOOD PRESSURE: 70 MMHG

## 2024-10-04 DIAGNOSIS — Z86.79 PERSONAL HISTORY OF OTHER DISEASES OF THE CIRCULATORY SYSTEM: ICD-10-CM

## 2024-10-04 DIAGNOSIS — E05.90 THYROTOXICOSIS, UNSPECIFIED W/OUT THYROTOXIC CRISIS OR STORM: ICD-10-CM

## 2024-10-04 DIAGNOSIS — Z85.3 PERSONAL HISTORY OF MALIGNANT NEOPLASM OF BREAST: ICD-10-CM

## 2024-10-04 DIAGNOSIS — Z86.39 PERSONAL HISTORY OF OTHER ENDOCRINE, NUTRITIONAL AND METABOLIC DISEASE: ICD-10-CM

## 2024-10-04 DIAGNOSIS — I48.0 PAROXYSMAL ATRIAL FIBRILLATION: ICD-10-CM

## 2024-10-04 DIAGNOSIS — I10 ESSENTIAL (PRIMARY) HYPERTENSION: ICD-10-CM

## 2024-10-04 DIAGNOSIS — E78.5 HYPERLIPIDEMIA, UNSPECIFIED: ICD-10-CM

## 2024-10-04 PROCEDURE — 99215 OFFICE O/P EST HI 40 MIN: CPT | Mod: 25

## 2024-10-04 PROCEDURE — 93000 ELECTROCARDIOGRAM COMPLETE: CPT

## 2024-10-04 RX ORDER — LOSARTAN POTASSIUM 50 MG/1
50 TABLET, FILM COATED ORAL DAILY
Qty: 90 | Refills: 3 | Status: ACTIVE | COMMUNITY
Start: 2024-10-04 | End: 1900-01-01

## 2024-10-04 RX ORDER — APIXABAN 5 MG/1
5 TABLET, FILM COATED ORAL
Qty: 90 | Refills: 3 | Status: ACTIVE | COMMUNITY
Start: 2024-10-04 | End: 1900-01-01

## 2024-10-04 RX ORDER — METOPROLOL SUCCINATE 25 MG/1
25 TABLET, EXTENDED RELEASE ORAL
Qty: 180 | Refills: 3 | Status: ACTIVE | COMMUNITY
Start: 2024-10-04 | End: 1900-01-01

## 2024-10-04 RX ORDER — ATENOLOL 25 MG/1
25 TABLET ORAL
Refills: 0 | Status: ACTIVE | COMMUNITY

## 2024-10-04 NOTE — REASON FOR VISIT
[Other: ____] : [unfilled] [CV Risk Factors and Non-Cardiac Disease] : CV risk factors and non-cardiac disease [Hypertension] : hypertension

## 2024-10-06 NOTE — END OF VISIT
[FreeTextEntry3] : I saw and evaluated the patient and discussed the care with the NP provider above on 10/04/2024 . I agree with the findings and plan as documented in the note above. I personally reviewed all of the hospital records available to me at this time, which included but are not limited to the discharge summary, labs and imaging reports. Had PAF. She will continue Eliquis.  change to toprol. She will get a Zio Patch to rule out arrhythmias.  [Time Spent: ___ minutes] : I have spent [unfilled] minutes of time on the encounter which excludes teaching and separately reported services.

## 2024-10-06 NOTE — DISCUSSION/SUMMARY
[EKG obtained to assist in diagnosis and management of assessed problem(s)] : EKG obtained to assist in diagnosis and management of assessed problem(s) [FreeTextEntry1] : She has history of hyperthyroidism on methimazole, Breast CA in 2011 s/p lumpectomy/chemo/radiation, HLD who presents today for post hospitalization follow up. She was admitted with hypotension and new onset afib in the setting of pain and viral illness.   I have reviewed all of the medical records available to me at this time from her admission at length, including consultations, laboratory records, radiologic records, medication administration records and discharge summary. She arrives in no acute distress.  she is euvolemic on exam.  ECG illustrates sinus rhythm.  She converted from afib with slow ventricular response to SR while in the ED.  Her b/p is controlled.  We discussed the natural history of atrial fibrillation, management options including rate vs rhythm control and associated stroke risk.  It is likely this episode was enabled by her abnormal thyroid function and acute viral illness.  At this time, I have advised she undergo ambulatory rhythm monitoring to assess her af burden.   She will also transition atenolol to metoprolol succinate 25mg BID for rate management. Her CHADS-Vasc is 3, she will remain on elquis but at full dose 5mg BID (she was initially started on 2.5 in the setting of ARACELI, but her cr has normalized) She will also undergo precautionary 2d echo to assess for any new structural heart disease or abnormalities in valvular and ventricular function.  Her blood pressure is now running on the higher side.  She will resume her preadmission Losartan at 50mg daily. she can remain off nifedipine for now.  I stressed the importance of following up with endocrinology regarding thyroid management.  Continue methimazole as dosed  Remain on lipid management with simvastatin 20mg for goal LDL <100 I will call the son with results of the stated cardiac testing once completed.  She can follow up with me again in 2 months.

## 2024-10-06 NOTE — HISTORY OF PRESENT ILLNESS
[FreeTextEntry1] : Ms Dawson arrives for post hospitalization follow up.  She is new to this practice. She has history of hyperthyroidism on methimazole, Breast CA in 2011 s/p lumpectomy/chemo/radiation, HLD  She was brought to the ED by EMS 9/21- 9/24 with c/o visual disturbances, fatigue and back pains. She was admitted for hypotension, B/P 55/35>80/56, she was also found to be in new onset afib which spontaneously converted while in ED. Patients hypotension resolved with IV fluid administration. Patients converted to sinus rhythm and heart rate was stable,  she was started on Eliquis 5mg PO given elevated CHADVASC. Patient had MR head, MR thoracic spine, and Nuclear medicine bone scan which revealed no osseous metastases, making metastatic disease less likely.  CT chest showed multinodular thyroid. B/W demonstrated TSH 0.18. RVP + for Rhino virus Pain likely 2/2 degenerative changes noted on MRI. Patient was seen by orthopedics and no intervention was necessary. Atenolol was resumed at 25mg daily, losartan, nifedipine were discontinued. She was advised to follow up o/p w/ endocrinology.  She arrives today with her son marzena.  She is now residing in [place] (used to live in the [place]).  She has follow up with endocrinology/surgery -Dr Emilio Martinez( [facility]) regarding thyroidectomy on 10/7/24.  She is back doing her regular activities, ambulating outside in backyard. she is now wearing a back brace given to her by ortho/spine . Her back pains are better.  She denies chest pains or pressure. She  denies dizzy spells, feeling faint or fainting, She   denies palpitations or difficulty breathing while laying flat. She denies lower extremity swelling.

## 2024-10-14 ENCOUNTER — APPOINTMENT (OUTPATIENT)
Dept: CARDIOLOGY | Facility: CLINIC | Age: 69
End: 2024-10-14
Payer: MEDICARE

## 2024-10-14 PROCEDURE — 93306 TTE W/DOPPLER COMPLETE: CPT

## 2024-10-15 PROCEDURE — 84443 ASSAY THYROID STIM HORMONE: CPT

## 2024-10-15 PROCEDURE — 85025 COMPLETE CBC W/AUTO DIFF WBC: CPT

## 2024-10-15 PROCEDURE — 86300 IMMUNOASSAY TUMOR CA 15-3: CPT

## 2024-10-15 PROCEDURE — 70450 CT HEAD/BRAIN W/O DYE: CPT | Mod: MC

## 2024-10-15 PROCEDURE — 83605 ASSAY OF LACTIC ACID: CPT

## 2024-10-15 PROCEDURE — 85610 PROTHROMBIN TIME: CPT

## 2024-10-15 PROCEDURE — A9579: CPT

## 2024-10-15 PROCEDURE — 99285 EMERGENCY DEPT VISIT HI MDM: CPT | Mod: 25

## 2024-10-15 PROCEDURE — 87040 BLOOD CULTURE FOR BACTERIA: CPT

## 2024-10-15 PROCEDURE — 78306 BONE IMAGING WHOLE BODY: CPT | Mod: MC

## 2024-10-15 PROCEDURE — 74176 CT ABD & PELVIS W/O CONTRAST: CPT | Mod: MC

## 2024-10-15 PROCEDURE — 96374 THER/PROPH/DIAG INJ IV PUSH: CPT

## 2024-10-15 PROCEDURE — 72157 MRI CHEST SPINE W/O & W/DYE: CPT | Mod: MC

## 2024-10-15 PROCEDURE — 82962 GLUCOSE BLOOD TEST: CPT

## 2024-10-15 PROCEDURE — 71045 X-RAY EXAM CHEST 1 VIEW: CPT

## 2024-10-15 PROCEDURE — A9561: CPT

## 2024-10-15 PROCEDURE — 84436 ASSAY OF TOTAL THYROXINE: CPT

## 2024-10-15 PROCEDURE — 84480 ASSAY TRIIODOTHYRONINE (T3): CPT

## 2024-10-15 PROCEDURE — 80053 COMPREHEN METABOLIC PANEL: CPT

## 2024-10-15 PROCEDURE — 36415 COLL VENOUS BLD VENIPUNCTURE: CPT

## 2024-10-15 PROCEDURE — 71250 CT THORAX DX C-: CPT | Mod: MC

## 2024-10-15 PROCEDURE — 84484 ASSAY OF TROPONIN QUANT: CPT

## 2024-10-15 PROCEDURE — 97162 PT EVAL MOD COMPLEX 30 MIN: CPT

## 2024-10-15 PROCEDURE — 93005 ELECTROCARDIOGRAM TRACING: CPT

## 2024-10-15 PROCEDURE — 84439 ASSAY OF FREE THYROXINE: CPT

## 2024-10-15 PROCEDURE — 85730 THROMBOPLASTIN TIME PARTIAL: CPT

## 2024-10-15 PROCEDURE — 0225U NFCT DS DNA&RNA 21 SARSCOV2: CPT

## 2024-10-15 PROCEDURE — 70553 MRI BRAIN STEM W/O & W/DYE: CPT | Mod: MC

## 2024-10-15 PROCEDURE — 81003 URINALYSIS AUTO W/O SCOPE: CPT

## 2024-10-18 ENCOUNTER — APPOINTMENT (OUTPATIENT)
Dept: CARDIOLOGY | Facility: CLINIC | Age: 69
End: 2024-10-18
Payer: MEDICARE

## 2024-10-18 ENCOUNTER — NON-APPOINTMENT (OUTPATIENT)
Age: 69
End: 2024-10-18

## 2024-10-18 VITALS
SYSTOLIC BLOOD PRESSURE: 189 MMHG | OXYGEN SATURATION: 95 % | HEART RATE: 73 BPM | DIASTOLIC BLOOD PRESSURE: 96 MMHG | HEIGHT: 65 IN | BODY MASS INDEX: 24.49 KG/M2 | WEIGHT: 147 LBS

## 2024-10-18 VITALS — SYSTOLIC BLOOD PRESSURE: 160 MMHG | DIASTOLIC BLOOD PRESSURE: 80 MMHG

## 2024-10-18 DIAGNOSIS — E78.5 HYPERLIPIDEMIA, UNSPECIFIED: ICD-10-CM

## 2024-10-18 DIAGNOSIS — I10 ESSENTIAL (PRIMARY) HYPERTENSION: ICD-10-CM

## 2024-10-18 DIAGNOSIS — I07.1 RHEUMATIC TRICUSPID INSUFFICIENCY: ICD-10-CM

## 2024-10-18 DIAGNOSIS — I27.20 PULMONARY HYPERTENSION, UNSPECIFIED: ICD-10-CM

## 2024-10-18 DIAGNOSIS — I34.0 NONRHEUMATIC MITRAL (VALVE) INSUFFICIENCY: ICD-10-CM

## 2024-10-18 DIAGNOSIS — I48.0 PAROXYSMAL ATRIAL FIBRILLATION: ICD-10-CM

## 2024-10-18 DIAGNOSIS — Z01.810 ENCOUNTER FOR PREPROCEDURAL CARDIOVASCULAR EXAMINATION: ICD-10-CM

## 2024-10-18 PROCEDURE — 93000 ELECTROCARDIOGRAM COMPLETE: CPT

## 2024-10-18 PROCEDURE — 99215 OFFICE O/P EST HI 40 MIN: CPT | Mod: 25

## 2024-10-18 RX ORDER — ESCITALOPRAM OXALATE 5 MG/1
5 TABLET ORAL
Refills: 0 | Status: ACTIVE | COMMUNITY

## 2024-10-18 RX ORDER — METHIMAZOLE 5 MG/1
5 TABLET ORAL 3 TIMES DAILY
Refills: 0 | Status: ACTIVE | COMMUNITY

## 2024-10-18 RX ORDER — SIMVASTATIN 20 MG/1
20 TABLET, FILM COATED ORAL
Refills: 0 | Status: ACTIVE | COMMUNITY

## 2024-10-18 RX ORDER — PANTOPRAZOLE 40 MG/1
40 TABLET, DELAYED RELEASE ORAL
Refills: 0 | Status: ACTIVE | COMMUNITY

## 2024-10-18 RX ORDER — MELATONIN 3 MG
TABLET ORAL
Refills: 0 | Status: ACTIVE | COMMUNITY

## 2024-10-18 RX ORDER — NAPROXEN 250 MG/1
250 TABLET ORAL
Refills: 0 | Status: ACTIVE | COMMUNITY

## 2024-10-28 ENCOUNTER — APPOINTMENT (OUTPATIENT)
Dept: PULMONOLOGY | Facility: CLINIC | Age: 69
End: 2024-10-28
Payer: MEDICARE

## 2024-10-28 ENCOUNTER — APPOINTMENT (OUTPATIENT)
Dept: SURGERY | Facility: CLINIC | Age: 69
End: 2024-10-28

## 2024-10-28 ENCOUNTER — NON-APPOINTMENT (OUTPATIENT)
Age: 69
End: 2024-10-28

## 2024-10-28 VITALS — SYSTOLIC BLOOD PRESSURE: 178 MMHG | OXYGEN SATURATION: 98 % | HEART RATE: 72 BPM | DIASTOLIC BLOOD PRESSURE: 89 MMHG

## 2024-10-28 VITALS
HEART RATE: 69 BPM | BODY MASS INDEX: 24.49 KG/M2 | HEIGHT: 65 IN | OXYGEN SATURATION: 98 % | SYSTOLIC BLOOD PRESSURE: 180 MMHG | WEIGHT: 147 LBS | DIASTOLIC BLOOD PRESSURE: 93 MMHG

## 2024-10-28 DIAGNOSIS — I27.20 PULMONARY HYPERTENSION, UNSPECIFIED: ICD-10-CM

## 2024-10-28 PROCEDURE — 94010 BREATHING CAPACITY TEST: CPT

## 2024-10-28 PROCEDURE — 94729 DIFFUSING CAPACITY: CPT

## 2024-10-28 PROCEDURE — 99204 OFFICE O/P NEW MOD 45 MIN: CPT | Mod: 25

## 2024-10-28 PROCEDURE — 94727 GAS DIL/WSHOT DETER LNG VOL: CPT

## 2024-10-28 PROCEDURE — ZZZZZ: CPT

## 2024-10-28 PROCEDURE — 94618 PULMONARY STRESS TESTING: CPT

## 2024-10-28 RX ORDER — PNV NO.95/FERROUS FUM/FOLIC AC 28MG-0.8MG
TABLET ORAL
Refills: 0 | Status: ACTIVE | COMMUNITY

## 2024-10-28 RX ORDER — MULTIVITAMIN
TABLET ORAL
Refills: 0 | Status: ACTIVE | COMMUNITY

## 2024-10-30 ENCOUNTER — APPOINTMENT (OUTPATIENT)
Dept: CARDIOLOGY | Facility: CLINIC | Age: 69
End: 2024-10-30
Payer: MEDICARE

## 2024-10-30 PROCEDURE — A9500: CPT

## 2024-10-30 PROCEDURE — 93015 CV STRESS TEST SUPVJ I&R: CPT

## 2024-10-30 PROCEDURE — 78452 HT MUSCLE IMAGE SPECT MULT: CPT

## 2024-11-01 ENCOUNTER — NON-APPOINTMENT (OUTPATIENT)
Age: 69
End: 2024-11-01

## 2024-11-13 ENCOUNTER — APPOINTMENT (OUTPATIENT)
Dept: CARDIOLOGY | Facility: CLINIC | Age: 69
End: 2024-11-13

## 2025-03-19 ENCOUNTER — APPOINTMENT (OUTPATIENT)
Dept: CARDIOLOGY | Facility: CLINIC | Age: 70
End: 2025-03-19
Payer: MEDICARE

## 2025-03-19 ENCOUNTER — NON-APPOINTMENT (OUTPATIENT)
Age: 70
End: 2025-03-19

## 2025-03-19 VITALS
HEART RATE: 71 BPM | BODY MASS INDEX: 22.99 KG/M2 | DIASTOLIC BLOOD PRESSURE: 72 MMHG | WEIGHT: 138 LBS | SYSTOLIC BLOOD PRESSURE: 156 MMHG | OXYGEN SATURATION: 98 % | HEIGHT: 65 IN

## 2025-03-19 DIAGNOSIS — E05.90 THYROTOXICOSIS, UNSPECIFIED W/OUT THYROTOXIC CRISIS OR STORM: ICD-10-CM

## 2025-03-19 PROCEDURE — 93000 ELECTROCARDIOGRAM COMPLETE: CPT | Mod: 59

## 2025-03-19 PROCEDURE — 99214 OFFICE O/P EST MOD 30 MIN: CPT | Mod: 25

## 2025-03-19 RX ORDER — LEVOTHYROXINE SODIUM 0.09 MG/1
88 TABLET ORAL
Refills: 0 | Status: ACTIVE | COMMUNITY
Start: 2025-03-19

## 2025-03-25 ENCOUNTER — EMERGENCY (EMERGENCY)
Facility: HOSPITAL | Age: 70
LOS: 1 days | Discharge: HOME | End: 2025-03-25
Attending: EMERGENCY MEDICINE | Admitting: EMERGENCY MEDICINE
Payer: COMMERCIAL

## 2025-03-25 VITALS
RESPIRATION RATE: 18 BRPM | TEMPERATURE: 98 F | WEIGHT: 130.07 LBS | DIASTOLIC BLOOD PRESSURE: 85 MMHG | HEART RATE: 69 BPM | HEIGHT: 64 IN | SYSTOLIC BLOOD PRESSURE: 179 MMHG | OXYGEN SATURATION: 98 %

## 2025-03-25 DIAGNOSIS — Z98.890 OTHER SPECIFIED POSTPROCEDURAL STATES: Chronic | ICD-10-CM

## 2025-03-25 DIAGNOSIS — Z90.49 ACQUIRED ABSENCE OF OTHER SPECIFIED PARTS OF DIGESTIVE TRACT: Chronic | ICD-10-CM

## 2025-03-25 LAB
ALBUMIN SERPL ELPH-MCNC: 3.1 G/DL — LOW (ref 3.3–5)
ALP SERPL-CCNC: 140 U/L — HIGH (ref 40–120)
ALT FLD-CCNC: 18 U/L — SIGNIFICANT CHANGE UP (ref 12–78)
ANION GAP SERPL CALC-SCNC: 7 MMOL/L — SIGNIFICANT CHANGE UP (ref 5–17)
APPEARANCE UR: CLEAR — SIGNIFICANT CHANGE UP
APTT BLD: 43.4 SEC — HIGH (ref 24.5–35.6)
AST SERPL-CCNC: 14 U/L — LOW (ref 15–37)
BASOPHILS # BLD AUTO: 0.02 K/UL — SIGNIFICANT CHANGE UP (ref 0–0.2)
BASOPHILS NFR BLD AUTO: 0.3 % — SIGNIFICANT CHANGE UP (ref 0–2)
BILIRUB SERPL-MCNC: 0.7 MG/DL — SIGNIFICANT CHANGE UP (ref 0.2–1.2)
BILIRUB UR-MCNC: NEGATIVE — SIGNIFICANT CHANGE UP
BUN SERPL-MCNC: 10 MG/DL — SIGNIFICANT CHANGE UP (ref 7–23)
CALCIUM SERPL-MCNC: 9.9 MG/DL — SIGNIFICANT CHANGE UP (ref 8.5–10.1)
CHLORIDE SERPL-SCNC: 109 MMOL/L — HIGH (ref 96–108)
CO2 SERPL-SCNC: 23 MMOL/L — SIGNIFICANT CHANGE UP (ref 22–31)
COLOR SPEC: YELLOW — SIGNIFICANT CHANGE UP
CREAT SERPL-MCNC: 0.82 MG/DL — SIGNIFICANT CHANGE UP (ref 0.5–1.3)
DIFF PNL FLD: NEGATIVE — SIGNIFICANT CHANGE UP
EGFR: 77 ML/MIN/1.73M2 — SIGNIFICANT CHANGE UP
EGFR: 77 ML/MIN/1.73M2 — SIGNIFICANT CHANGE UP
EOSINOPHIL # BLD AUTO: 0.27 K/UL — SIGNIFICANT CHANGE UP (ref 0–0.5)
EOSINOPHIL NFR BLD AUTO: 4.1 % — SIGNIFICANT CHANGE UP (ref 0–6)
GLUCOSE SERPL-MCNC: 114 MG/DL — HIGH (ref 70–99)
GLUCOSE UR QL: NEGATIVE MG/DL — SIGNIFICANT CHANGE UP
HCT VFR BLD CALC: 39.2 % — SIGNIFICANT CHANGE UP (ref 34.5–45)
HGB BLD-MCNC: 13.1 G/DL — SIGNIFICANT CHANGE UP (ref 11.5–15.5)
IMM GRANULOCYTES NFR BLD AUTO: 0.5 % — SIGNIFICANT CHANGE UP (ref 0–0.9)
INR BLD: 1.13 RATIO — SIGNIFICANT CHANGE UP (ref 0.85–1.16)
KETONES UR-MCNC: NEGATIVE MG/DL — SIGNIFICANT CHANGE UP
LEUKOCYTE ESTERASE UR-ACNC: NEGATIVE — SIGNIFICANT CHANGE UP
LIDOCAIN IGE QN: 86 U/L — HIGH (ref 13–75)
LYMPHOCYTES # BLD AUTO: 1.28 K/UL — SIGNIFICANT CHANGE UP (ref 1–3.3)
LYMPHOCYTES # BLD AUTO: 19.5 % — SIGNIFICANT CHANGE UP (ref 13–44)
MCHC RBC-ENTMCNC: 27.1 PG — SIGNIFICANT CHANGE UP (ref 27–34)
MCHC RBC-ENTMCNC: 33.4 G/DL — SIGNIFICANT CHANGE UP (ref 32–36)
MCV RBC AUTO: 81 FL — SIGNIFICANT CHANGE UP (ref 80–100)
MONOCYTES # BLD AUTO: 0.69 K/UL — SIGNIFICANT CHANGE UP (ref 0–0.9)
MONOCYTES NFR BLD AUTO: 10.5 % — SIGNIFICANT CHANGE UP (ref 2–14)
NEUTROPHILS # BLD AUTO: 4.27 K/UL — SIGNIFICANT CHANGE UP (ref 1.8–7.4)
NEUTROPHILS NFR BLD AUTO: 65.1 % — SIGNIFICANT CHANGE UP (ref 43–77)
NITRITE UR-MCNC: NEGATIVE — SIGNIFICANT CHANGE UP
NRBC BLD AUTO-RTO: 0 /100 WBCS — SIGNIFICANT CHANGE UP (ref 0–0)
PH UR: 7 — SIGNIFICANT CHANGE UP (ref 5–8)
PLATELET # BLD AUTO: 236 K/UL — SIGNIFICANT CHANGE UP (ref 150–400)
POTASSIUM SERPL-MCNC: 4.2 MMOL/L — SIGNIFICANT CHANGE UP (ref 3.5–5.3)
POTASSIUM SERPL-SCNC: 4.2 MMOL/L — SIGNIFICANT CHANGE UP (ref 3.5–5.3)
PROT SERPL-MCNC: 8.3 G/DL — SIGNIFICANT CHANGE UP (ref 6–8.3)
PROT UR-MCNC: NEGATIVE MG/DL — SIGNIFICANT CHANGE UP
PROTHROM AB SERPL-ACNC: 13.2 SEC — SIGNIFICANT CHANGE UP (ref 9.9–13.4)
RBC # BLD: 4.84 M/UL — SIGNIFICANT CHANGE UP (ref 3.8–5.2)
RBC # FLD: 13.7 % — SIGNIFICANT CHANGE UP (ref 10.3–14.5)
SODIUM SERPL-SCNC: 139 MMOL/L — SIGNIFICANT CHANGE UP (ref 135–145)
SP GR SPEC: 1 — SIGNIFICANT CHANGE UP (ref 1–1.03)
UROBILINOGEN FLD QL: 0.2 MG/DL — SIGNIFICANT CHANGE UP (ref 0.2–1)
WBC # BLD: 6.56 K/UL — SIGNIFICANT CHANGE UP (ref 3.8–10.5)
WBC # FLD AUTO: 6.56 K/UL — SIGNIFICANT CHANGE UP (ref 3.8–10.5)

## 2025-03-25 PROCEDURE — 72129 CT CHEST SPINE W/DYE: CPT | Mod: 26

## 2025-03-25 PROCEDURE — 99285 EMERGENCY DEPT VISIT HI MDM: CPT

## 2025-03-25 PROCEDURE — 72132 CT LUMBAR SPINE W/DYE: CPT | Mod: 26

## 2025-03-25 PROCEDURE — 74177 CT ABD & PELVIS W/CONTRAST: CPT | Mod: 26

## 2025-03-25 RX ORDER — DEXAMETHASONE 0.5 MG/1
10 TABLET ORAL ONCE
Refills: 0 | Status: COMPLETED | OUTPATIENT
Start: 2025-03-25 | End: 2025-03-25

## 2025-03-25 RX ORDER — LOSARTAN POTASSIUM 100 MG/1
50 TABLET, FILM COATED ORAL DAILY
Refills: 0 | Status: ACTIVE | OUTPATIENT
Start: 2025-03-25 | End: 2026-02-21

## 2025-03-25 RX ORDER — ESCITALOPRAM OXALATE 20 MG/1
5 TABLET ORAL DAILY
Refills: 0 | Status: ACTIVE | OUTPATIENT
Start: 2025-03-25 | End: 2026-02-21

## 2025-03-25 RX ORDER — DIAZEPAM 2 MG/1
5 TABLET ORAL ONCE
Refills: 0 | Status: DISCONTINUED | OUTPATIENT
Start: 2025-03-25 | End: 2025-03-25

## 2025-03-25 RX ORDER — APIXABAN 2.5 MG/1
2.5 TABLET, FILM COATED ORAL
Refills: 0 | Status: ACTIVE | OUTPATIENT
Start: 2025-03-25 | End: 2026-02-21

## 2025-03-25 RX ORDER — LIDOCAINE HYDROCHLORIDE 20 MG/ML
1 JELLY TOPICAL ONCE
Refills: 0 | Status: COMPLETED | OUTPATIENT
Start: 2025-03-25 | End: 2025-03-25

## 2025-03-25 RX ORDER — ATORVASTATIN CALCIUM 80 MG/1
10 TABLET, FILM COATED ORAL AT BEDTIME
Refills: 0 | Status: ACTIVE | OUTPATIENT
Start: 2025-03-25 | End: 2026-02-21

## 2025-03-25 RX ORDER — LEVOTHYROXINE SODIUM 300 MCG
88 TABLET ORAL DAILY
Refills: 0 | Status: ACTIVE | OUTPATIENT
Start: 2025-03-25 | End: 2026-02-21

## 2025-03-25 RX ORDER — METOPROLOL SUCCINATE 50 MG/1
25 TABLET, EXTENDED RELEASE ORAL
Refills: 0 | Status: ACTIVE | OUTPATIENT
Start: 2025-03-25 | End: 2026-02-21

## 2025-03-25 RX ADMIN — DEXAMETHASONE 102 MILLIGRAM(S): 0.5 TABLET ORAL at 20:39

## 2025-03-25 RX ADMIN — METOPROLOL SUCCINATE 25 MILLIGRAM(S): 50 TABLET, EXTENDED RELEASE ORAL at 23:57

## 2025-03-25 RX ADMIN — DEXAMETHASONE 10 MILLIGRAM(S): 0.5 TABLET ORAL at 21:15

## 2025-03-25 RX ADMIN — LOSARTAN POTASSIUM 50 MILLIGRAM(S): 100 TABLET, FILM COATED ORAL at 23:57

## 2025-03-25 RX ADMIN — Medication 1000 MILLILITER(S): at 20:39

## 2025-03-25 RX ADMIN — DIAZEPAM 5 MILLIGRAM(S): 2 TABLET ORAL at 20:40

## 2025-03-25 RX ADMIN — LIDOCAINE HYDROCHLORIDE 1 PATCH: 20 JELLY TOPICAL at 20:40

## 2025-03-25 RX ADMIN — ATORVASTATIN CALCIUM 10 MILLIGRAM(S): 80 TABLET, FILM COATED ORAL at 23:57

## 2025-03-25 RX ADMIN — Medication 1000 MILLILITER(S): at 22:00

## 2025-03-25 NOTE — ED ADULT TRIAGE NOTE - CHIEF COMPLAINT QUOTE
A&Ox4 with complaints of lower back pain radiating to lower abdomen x 2-3 weeks. denies N/V/D. denies urinary complaints. denies fevers/chills

## 2025-03-25 NOTE — ED PROVIDER NOTE - DIFFERENTIAL DIAGNOSIS
Differential Diagnosis Patient is presenting to the emergency room with a chief complaint of abdominal and back pain.  Differential is broad includes possible degenerative disc disease versus acute intra-abdominal pathology given cancer history metastasis must also be on the list.  Obtain screening labs check UA urine culture obtain CT imaging hydrate medicate for pain and monitor.  Ultimate clinical disposition will be pending results.

## 2025-03-25 NOTE — ED PROVIDER NOTE - OBJECTIVE STATEMENT
Pt is a 69-year-old female who presents to the emergency room with a chief complaint of abdominal back pain.  Past medical history of hypothyroidism hypertension breast CVA hyperlipidemia.  Patient was last admitted to the hospital from September 21 through September 24, 2024 with hypotension.  She was found to be hypotensive with new onset A-fib which spontaneously converted.  Hypotension resolved with fluids.  She was started on Eliquis 5 mg.  She has severe back pain and there was concern for possible bony metastatic disease.  MRI of the brain and thoracic spine and nuclear medicine scan revealed no osseous metastasis.  Changes are likely secondary to degenerative disc disease.  Stabilized and was discharged.  Reports that the patient has had worsening of back pain wrapping around to her stomach for the last 2 weeks significantly worsened over the last several days.  She has been taking her home muscle relaxant without improvement.  Denies any fevers chills nausea vomiting chest pain shortness of breath.  Pain does not radiate down the legs.  Symptoms are significantly worsened with movement.

## 2025-03-25 NOTE — ED PROVIDER NOTE - CARE PROVIDER_API CALL
Mary Cuevas  Orthopaedic Surgery  [address] [facility], [address] [address]  Phone: [phone]  Fax: [phone]  Follow Up Time:

## 2025-03-25 NOTE — ED PROVIDER NOTE - CLINICAL SUMMARY MEDICAL DECISION MAKING FREE TEXT BOX
Pt is a 69-year-old female who presents to the emergency room with a chief complaint of abdominal back pain.  Past medical history of hypothyroidism hypertension breast CVA hyperlipidemia.  Patient was last admitted to the hospital from September 21 through September 24, 2024 with hypotension.  She was found to be hypotensive with new onset A-fib which spontaneously converted.  Hypotension resolved with fluids.  She was started on Eliquis 5 mg.  She has severe back pain and there was concern for possible bony metastatic disease.  MRI of the brain and thoracic spine and nuclear medicine scan revealed no osseous metastasis.  Changes are likely secondary to degenerative disc disease.  Stabilized and was discharged.  Reports that the patient has had worsening of back pain wrapping around to her stomach for the last 2 weeks significantly worsened over the last several days.  She has been taking her home muscle relaxant without improvement.  Denies any fevers chills nausea vomiting chest pain shortness of breath.  Pain does not radiate down the legs.  Symptoms are significantly worsened with movement. Patient is presenting to the emergency room with a chief complaint of abdominal and back pain.  Differential is broad includes possible degenerative disc disease versus acute intra-abdominal pathology given cancer history metastasis must also be on the list.  Obtain screening labs check UA urine culture obtain CT imaging hydrate medicate for pain and monitor.  Ultimate clinical disposition will be pending results.

## 2025-03-25 NOTE — ED PROVIDER NOTE - PATIENT PORTAL LINK FT
You can access the FollowMyHealth Patient Portal offered by [facility] by registering at the following website: [email or web address]. By joining [facility]’s FollowMyHealth portal, you will also be able to view your health information using other applications (apps) compatible with our system.

## 2025-03-25 NOTE — ED PROVIDER NOTE - PROGRESS NOTE DETAILS
Patient doing well, is still having some discomfort, but is otherwise asymptomatic.  The patient was able to ambulate with physical therapy, physical therapy has cleared the patient for discharge, outpatient follow-up.  The patient will return with any acute change or concerns.  I discussed at length with the patient's son and with the patient.  I discussed importance of close prompt follow-up.  The patient has been following with orthopedics, Dr. Cuevas, and has a follow-up appointment with spine physician on Tuesday.  I discussed with him regarding discussion with the original orthopedist to schedule an MRI, while waiting for the appointment with spine orthopedist.  Patient will return with any worsening or persistent pain, numbness or tingling, inability to ambulate, or any other issues or concerns.

## 2025-03-25 NOTE — ED ADULT NURSE NOTE - OBJECTIVE STATEMENT
Pt. received alert and oriented x4 with chief complaint of "months" of back pain worsening today. Pt. states midline back pain.

## 2025-03-25 NOTE — ED PROVIDER NOTE - NSFOLLOWUPINSTRUCTIONS_ED_ALL_ED_FT
1.  Follow-up with your Primary Medical Doctor or referred doctor. Call today / next business day for prompt follow-up.  2.  With back pain, whether it is a new pain or a chronic pain, it is very important to have close, prompt outpatient follow-up for continued workup, evaluation and definitive diagnosis.  If you develop worsening or persistent pain, any numbness, tingling, weakness of one or both of your legs, any fever, or any changes / difficulty urinating then you will need to see your back doctor immediately or you can return to the emergency room.  Many times your doctor will need to set you up for further imaging / testing such as an MRI.  3.  See attached instruction sheets for additional information, including information regarding signs and symptoms to look out for, reasons to seek immediate care and other important instructions.  4.  Follow-up with your Orthopedist on Tuesday as scheduled for prompt follow-up for further workup and treatment. See the referred doctor for information.

## 2025-03-26 VITALS
HEART RATE: 89 BPM | DIASTOLIC BLOOD PRESSURE: 789 MMHG | RESPIRATION RATE: 16 BRPM | SYSTOLIC BLOOD PRESSURE: 132 MMHG | TEMPERATURE: 98 F

## 2025-03-26 PROCEDURE — 97162 PT EVAL MOD COMPLEX 30 MIN: CPT

## 2025-03-26 PROCEDURE — 85730 THROMBOPLASTIN TIME PARTIAL: CPT

## 2025-03-26 PROCEDURE — 85025 COMPLETE CBC W/AUTO DIFF WBC: CPT

## 2025-03-26 PROCEDURE — 36415 COLL VENOUS BLD VENIPUNCTURE: CPT

## 2025-03-26 PROCEDURE — 72129 CT CHEST SPINE W/DYE: CPT | Mod: MC

## 2025-03-26 PROCEDURE — 85610 PROTHROMBIN TIME: CPT

## 2025-03-26 PROCEDURE — 80053 COMPREHEN METABOLIC PANEL: CPT

## 2025-03-26 PROCEDURE — 72132 CT LUMBAR SPINE W/DYE: CPT | Mod: MC

## 2025-03-26 PROCEDURE — 83690 ASSAY OF LIPASE: CPT

## 2025-03-26 PROCEDURE — 74177 CT ABD & PELVIS W/CONTRAST: CPT | Mod: MC

## 2025-03-26 PROCEDURE — 96365 THER/PROPH/DIAG IV INF INIT: CPT | Mod: XU

## 2025-03-26 PROCEDURE — 99284 EMERGENCY DEPT VISIT MOD MDM: CPT | Mod: 25

## 2025-03-26 PROCEDURE — 96361 HYDRATE IV INFUSION ADD-ON: CPT

## 2025-03-26 PROCEDURE — 81003 URINALYSIS AUTO W/O SCOPE: CPT

## 2025-03-26 RX ORDER — OXYCODONE HYDROCHLORIDE AND ACETAMINOPHEN 10; 325 MG/1; MG/1
1 TABLET ORAL
Qty: 10 | Refills: 0
Start: 2025-03-26

## 2025-03-26 RX ADMIN — Medication 40 MILLIGRAM(S): at 06:35

## 2025-03-26 RX ADMIN — ESCITALOPRAM OXALATE 5 MILLIGRAM(S): 20 TABLET ORAL at 06:35

## 2025-03-26 RX ADMIN — Medication 88 MICROGRAM(S): at 06:35

## 2025-03-26 RX ADMIN — APIXABAN 2.5 MILLIGRAM(S): 2.5 TABLET, FILM COATED ORAL at 07:07

## 2025-03-26 NOTE — SOCIAL WORK PROGRESS NOTE - NSSWPROGRESSNOTE_GEN_ALL_CORE
Sw consulted for decreased ability to walk. Pt was seen by PT and deemed to have no skilled PT needs. Pt is safe to return home. SW to close consult and made available for any further assistance needed.

## 2025-03-26 NOTE — PHYSICAL THERAPY INITIAL EVALUATION ADULT - PERTINENT HX OF CURRENT PROBLEM, REHAB EVAL
69-year-old female who presents to the emergency room with a chief complaint of abdominal back pain.  Past medical history of hypothyroidism hypertension breast CVA hyperlipidemia.  Patient was last admitted to the hospital from September 21 through September 24, 2024 with hypotension.  She was found to be hypotensive with new onset A-fib which spontaneously converted.  Hypotension resolved with fluids.  She was started on Eliquis 5 mg.  She has severe back pain and there was concern for possible bony metastatic disease.  MRI of the brain and thoracic spine and nuclear medicine scan revealed no osseous metastasis.  Changes are likely secondary to degenerative disc disease.  Stabilized and was discharged.  Reports that the patient has had worsening of back pain wrapping around to her stomach for the last 2 weeks significantly worsened over the last several days.  She has been taking her home muscle relaxant without improvement. No acute intra-abdominal or pelvic findings. CT(lumbar) Mild intrahepatic and extra hepatic biliary ductal dilatation within normal limits postcholecystectomy. Unchanged left breast seroma compared with prior exam from 9/21/2024. No acute lumbar spine fracture or evidence of traumatic malalignment. Mild lumbar spondylosis. No significant spinal canal stenosis or neural foraminal stenosis. No suspicious lytic or sclerotic bony lesion.

## 2025-03-26 NOTE — PHYSICAL THERAPY INITIAL EVALUATION ADULT - IMPAIRMENTS CONTRIBUTING IMPAIRED BED MOBILITY, REHAB EVAL
[FreeTextEntry1] : 89 yo man with CKD 3, CAD, recurrent UTIs, for follow up evaluation. remains very active, but does need to limit a bit due to leg pains when he walks- starts in low back- so c/w known sciatica- but also concern for claudication. For f/u with Dr. Nice later in week energy appetite good no recurrent UTIs for some time now- no urinary symptoms today h/o LUTS- BPH No NSAID use. Denies flank pain dysuria or hematuria-  no CP; no SOB  
decreased flexibility/pain/decreased strength

## 2025-03-26 NOTE — PHYSICAL THERAPY INITIAL EVALUATION ADULT - ADDITIONAL COMMENTS
Pt lives in a house w/ no stairs/rail.  Pt is primarily a household ambulator.  Pt has a rolling walker at home.  Pt's son-in-law works at home and can assist pt as needed.

## 2025-03-26 NOTE — PHYSICAL THERAPY INITIAL EVALUATION ADULT - NSACTIVITYREC_GEN_A_PT
40 minutes total time spent with patient for PT evaluation. Patient ambulate ~ 150' w/ CGA.  No loss of balance or deviation of path.  Pt reported pain increased from 6/10 (sitting) to 8-9/10 (ambulating).   Son-in-law present.  Pt is at or close to baseline function. Pt limited primarily due to pain.  MD/RN notified.

## 2025-04-03 NOTE — CARE COORDINATION ASSESSMENT. - LIVING ARRANGEMENTS, PROFILE
Patient found to have large pleural effusion on imaging. I have personally reviewed and interpreted the following imaging: Xray and CT. A thoracentesis was not ordered. Management to include pulmonology consulted and recommended interventional radiology for thoracentesis, fluid studies pending  Diurese    house

## 2025-05-28 ENCOUNTER — APPOINTMENT (OUTPATIENT)
Dept: CARDIOLOGY | Facility: CLINIC | Age: 70
End: 2025-05-28
Payer: MEDICARE

## 2025-05-28 PROCEDURE — 93306 TTE W/DOPPLER COMPLETE: CPT
